# Patient Record
Sex: FEMALE | Race: WHITE | Employment: FULL TIME | URBAN - METROPOLITAN AREA
[De-identification: names, ages, dates, MRNs, and addresses within clinical notes are randomized per-mention and may not be internally consistent; named-entity substitution may affect disease eponyms.]

---
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
## 2019-01-26 ENCOUNTER — COMMUNICATION - HEALTHEAST (OUTPATIENT)
Dept: PEDIATRICS | Facility: CLINIC | Age: 19
End: 2019-01-26

## 2019-01-26 DIAGNOSIS — F90.8 ATTENTION DEFICIT HYPERACTIVITY DISORDER (ADHD), OTHER TYPE: ICD-10-CM

## 2019-03-12 ENCOUNTER — COMMUNICATION - HEALTHEAST (OUTPATIENT)
Dept: PEDIATRICS | Facility: CLINIC | Age: 19
End: 2019-03-12

## 2019-03-13 ENCOUNTER — OFFICE VISIT - HEALTHEAST (OUTPATIENT)
Dept: PEDIATRICS | Facility: CLINIC | Age: 19
End: 2019-03-13

## 2019-03-13 ENCOUNTER — COMMUNICATION - HEALTHEAST (OUTPATIENT)
Dept: ADMINISTRATIVE | Facility: CLINIC | Age: 19
End: 2019-03-13

## 2019-03-13 DIAGNOSIS — F32.81 PMDD (PREMENSTRUAL DYSPHORIC DISORDER): ICD-10-CM

## 2019-03-13 DIAGNOSIS — F40.10 SOCIAL ANXIETY DISORDER: ICD-10-CM

## 2019-03-13 DIAGNOSIS — F33.2 SEVERE EPISODE OF RECURRENT MAJOR DEPRESSIVE DISORDER, WITHOUT PSYCHOTIC FEATURES (H): ICD-10-CM

## 2019-03-13 DIAGNOSIS — F90.8 ATTENTION DEFICIT HYPERACTIVITY DISORDER (ADHD), OTHER TYPE: ICD-10-CM

## 2019-03-13 LAB — HCG UR QL: NEGATIVE

## 2019-04-14 ENCOUNTER — COMMUNICATION - HEALTHEAST (OUTPATIENT)
Dept: PEDIATRICS | Facility: CLINIC | Age: 19
End: 2019-04-14

## 2019-04-14 DIAGNOSIS — F90.8 ATTENTION DEFICIT HYPERACTIVITY DISORDER (ADHD), OTHER TYPE: ICD-10-CM

## 2019-05-09 ENCOUNTER — COMMUNICATION - HEALTHEAST (OUTPATIENT)
Dept: ADMINISTRATIVE | Facility: CLINIC | Age: 19
End: 2019-05-09

## 2019-05-23 ENCOUNTER — COMMUNICATION - HEALTHEAST (OUTPATIENT)
Dept: ADMINISTRATIVE | Facility: CLINIC | Age: 19
End: 2019-05-23

## 2019-06-01 ENCOUNTER — COMMUNICATION - HEALTHEAST (OUTPATIENT)
Dept: PEDIATRICS | Facility: CLINIC | Age: 19
End: 2019-06-01

## 2019-06-01 DIAGNOSIS — F90.8 ATTENTION DEFICIT HYPERACTIVITY DISORDER (ADHD), OTHER TYPE: ICD-10-CM

## 2019-06-03 ENCOUNTER — COMMUNICATION - HEALTHEAST (OUTPATIENT)
Dept: PEDIATRICS | Facility: CLINIC | Age: 19
End: 2019-06-03

## 2019-06-03 DIAGNOSIS — R55 BRIEF LOSS OF CONSCIOUSNESS: ICD-10-CM

## 2019-06-04 ENCOUNTER — COMMUNICATION - HEALTHEAST (OUTPATIENT)
Dept: ADMINISTRATIVE | Facility: CLINIC | Age: 19
End: 2019-06-04

## 2019-06-05 ENCOUNTER — OFFICE VISIT - HEALTHEAST (OUTPATIENT)
Dept: PEDIATRICS | Facility: CLINIC | Age: 19
End: 2019-06-05

## 2019-06-05 DIAGNOSIS — D64.9 ANEMIA, UNSPECIFIED TYPE: ICD-10-CM

## 2019-06-05 DIAGNOSIS — R56.9 SEIZURE (H): ICD-10-CM

## 2019-06-10 ENCOUNTER — RECORDS - HEALTHEAST (OUTPATIENT)
Dept: ADMINISTRATIVE | Facility: OTHER | Age: 19
End: 2019-06-10

## 2019-06-17 ENCOUNTER — OFFICE VISIT - HEALTHEAST (OUTPATIENT)
Dept: PEDIATRICS | Facility: CLINIC | Age: 19
End: 2019-06-17

## 2019-06-17 DIAGNOSIS — G40.409 OTHER GENERALIZED EPILEPSY, NOT INTRACTABLE, WITHOUT STATUS EPILEPTICUS (H): ICD-10-CM

## 2019-06-17 DIAGNOSIS — F40.10 SOCIAL ANXIETY DISORDER: ICD-10-CM

## 2019-06-17 DIAGNOSIS — F90.8 ATTENTION DEFICIT HYPERACTIVITY DISORDER (ADHD), OTHER TYPE: ICD-10-CM

## 2019-06-17 DIAGNOSIS — F32.81 PREMENSTRUAL DYSPHORIC DISORDER: ICD-10-CM

## 2019-06-17 DIAGNOSIS — Z30.41 ENCOUNTER FOR SURVEILLANCE OF CONTRACEPTIVE PILLS: ICD-10-CM

## 2019-06-17 DIAGNOSIS — F32.1 CURRENT MODERATE EPISODE OF MAJOR DEPRESSIVE DISORDER, UNSPECIFIED WHETHER RECURRENT (H): ICD-10-CM

## 2019-06-24 ENCOUNTER — COMMUNICATION - HEALTHEAST (OUTPATIENT)
Dept: PEDIATRICS | Facility: CLINIC | Age: 19
End: 2019-06-24

## 2019-07-05 ENCOUNTER — COMMUNICATION - HEALTHEAST (OUTPATIENT)
Dept: PEDIATRICS | Facility: CLINIC | Age: 19
End: 2019-07-05

## 2019-07-05 DIAGNOSIS — F90.8 ATTENTION DEFICIT HYPERACTIVITY DISORDER (ADHD), OTHER TYPE: ICD-10-CM

## 2019-07-08 ENCOUNTER — COMMUNICATION - HEALTHEAST (OUTPATIENT)
Dept: PEDIATRICS | Facility: CLINIC | Age: 19
End: 2019-07-08

## 2019-07-08 DIAGNOSIS — F40.10 SOCIAL ANXIETY DISORDER: ICD-10-CM

## 2019-07-08 DIAGNOSIS — F32.1 CURRENT MODERATE EPISODE OF MAJOR DEPRESSIVE DISORDER, UNSPECIFIED WHETHER RECURRENT (H): ICD-10-CM

## 2019-07-23 ENCOUNTER — COMMUNICATION - HEALTHEAST (OUTPATIENT)
Dept: PEDIATRICS | Facility: CLINIC | Age: 19
End: 2019-07-23

## 2019-07-23 DIAGNOSIS — F32.1 CURRENT MODERATE EPISODE OF MAJOR DEPRESSIVE DISORDER, UNSPECIFIED WHETHER RECURRENT (H): ICD-10-CM

## 2019-07-23 DIAGNOSIS — F40.10 SOCIAL ANXIETY DISORDER: ICD-10-CM

## 2019-07-25 ENCOUNTER — COMMUNICATION - HEALTHEAST (OUTPATIENT)
Dept: PEDIATRICS | Facility: CLINIC | Age: 19
End: 2019-07-25

## 2019-07-25 DIAGNOSIS — F90.8 ATTENTION DEFICIT HYPERACTIVITY DISORDER (ADHD), OTHER TYPE: ICD-10-CM

## 2019-08-20 ENCOUNTER — COMMUNICATION - HEALTHEAST (OUTPATIENT)
Dept: PEDIATRICS | Facility: CLINIC | Age: 19
End: 2019-08-20

## 2019-08-20 DIAGNOSIS — F40.10 SOCIAL ANXIETY DISORDER: ICD-10-CM

## 2019-08-20 DIAGNOSIS — F32.1 CURRENT MODERATE EPISODE OF MAJOR DEPRESSIVE DISORDER, UNSPECIFIED WHETHER RECURRENT (H): ICD-10-CM

## 2019-08-21 ENCOUNTER — COMMUNICATION - HEALTHEAST (OUTPATIENT)
Dept: PEDIATRICS | Facility: CLINIC | Age: 19
End: 2019-08-21

## 2019-08-21 DIAGNOSIS — F90.8 ATTENTION DEFICIT HYPERACTIVITY DISORDER (ADHD), OTHER TYPE: ICD-10-CM

## 2019-08-21 DIAGNOSIS — F40.10 SOCIAL ANXIETY DISORDER: ICD-10-CM

## 2019-08-21 DIAGNOSIS — D64.9 ANEMIA, UNSPECIFIED TYPE: ICD-10-CM

## 2019-08-21 DIAGNOSIS — F32.1 CURRENT MODERATE EPISODE OF MAJOR DEPRESSIVE DISORDER, UNSPECIFIED WHETHER RECURRENT (H): ICD-10-CM

## 2019-08-23 ENCOUNTER — COMMUNICATION - HEALTHEAST (OUTPATIENT)
Dept: PEDIATRICS | Facility: CLINIC | Age: 19
End: 2019-08-23

## 2019-09-17 ENCOUNTER — COMMUNICATION - HEALTHEAST (OUTPATIENT)
Dept: PEDIATRICS | Facility: CLINIC | Age: 19
End: 2019-09-17

## 2019-09-17 DIAGNOSIS — F40.10 SOCIAL ANXIETY DISORDER: ICD-10-CM

## 2019-09-17 DIAGNOSIS — F32.1 CURRENT MODERATE EPISODE OF MAJOR DEPRESSIVE DISORDER, UNSPECIFIED WHETHER RECURRENT (H): ICD-10-CM

## 2019-09-20 ENCOUNTER — COMMUNICATION - HEALTHEAST (OUTPATIENT)
Dept: PEDIATRICS | Facility: CLINIC | Age: 19
End: 2019-09-20

## 2019-10-20 ENCOUNTER — COMMUNICATION - HEALTHEAST (OUTPATIENT)
Dept: PEDIATRICS | Facility: CLINIC | Age: 19
End: 2019-10-20

## 2019-10-20 DIAGNOSIS — F90.8 ATTENTION DEFICIT HYPERACTIVITY DISORDER (ADHD), OTHER TYPE: ICD-10-CM

## 2019-11-26 ENCOUNTER — COMMUNICATION - HEALTHEAST (OUTPATIENT)
Dept: PEDIATRICS | Facility: CLINIC | Age: 19
End: 2019-11-26

## 2019-11-26 DIAGNOSIS — F90.8 ATTENTION DEFICIT HYPERACTIVITY DISORDER (ADHD), OTHER TYPE: ICD-10-CM

## 2019-11-26 DIAGNOSIS — D64.9 ANEMIA, UNSPECIFIED TYPE: ICD-10-CM

## 2019-12-31 ENCOUNTER — COMMUNICATION - HEALTHEAST (OUTPATIENT)
Dept: PEDIATRICS | Facility: CLINIC | Age: 19
End: 2019-12-31

## 2019-12-31 DIAGNOSIS — F90.8 ATTENTION DEFICIT HYPERACTIVITY DISORDER (ADHD), OTHER TYPE: ICD-10-CM

## 2020-01-10 ENCOUNTER — OFFICE VISIT - HEALTHEAST (OUTPATIENT)
Dept: PEDIATRICS | Facility: CLINIC | Age: 20
End: 2020-01-10

## 2020-01-10 DIAGNOSIS — F90.8 ATTENTION DEFICIT HYPERACTIVITY DISORDER (ADHD), OTHER TYPE: ICD-10-CM

## 2020-01-10 DIAGNOSIS — F32.1 MODERATE MAJOR DEPRESSION (H): ICD-10-CM

## 2020-01-10 DIAGNOSIS — F40.10 SOCIAL ANXIETY DISORDER: ICD-10-CM

## 2020-01-10 DIAGNOSIS — F32.1 CURRENT MODERATE EPISODE OF MAJOR DEPRESSIVE DISORDER, UNSPECIFIED WHETHER RECURRENT (H): ICD-10-CM

## 2020-01-10 ASSESSMENT — PATIENT HEALTH QUESTIONNAIRE - PHQ9: SUM OF ALL RESPONSES TO PHQ QUESTIONS 1-9: 13

## 2020-01-10 ASSESSMENT — ANXIETY QUESTIONNAIRES
2. NOT BEING ABLE TO STOP OR CONTROL WORRYING: MORE THAN HALF THE DAYS
IF YOU CHECKED OFF ANY PROBLEMS ON THIS QUESTIONNAIRE, HOW DIFFICULT HAVE THESE PROBLEMS MADE IT FOR YOU TO DO YOUR WORK, TAKE CARE OF THINGS AT HOME, OR GET ALONG WITH OTHER PEOPLE: SOMEWHAT DIFFICULT
6. BECOMING EASILY ANNOYED OR IRRITABLE: MORE THAN HALF THE DAYS
1. FEELING NERVOUS, ANXIOUS, OR ON EDGE: MORE THAN HALF THE DAYS
4. TROUBLE RELAXING: SEVERAL DAYS
GAD7 TOTAL SCORE: 12
5. BEING SO RESTLESS THAT IT IS HARD TO SIT STILL: SEVERAL DAYS
3. WORRYING TOO MUCH ABOUT DIFFERENT THINGS: MORE THAN HALF THE DAYS
7. FEELING AFRAID AS IF SOMETHING AWFUL MIGHT HAPPEN: MORE THAN HALF THE DAYS

## 2020-02-13 ENCOUNTER — COMMUNICATION - HEALTHEAST (OUTPATIENT)
Dept: PEDIATRICS | Facility: CLINIC | Age: 20
End: 2020-02-13

## 2020-02-13 DIAGNOSIS — F90.8 ATTENTION DEFICIT HYPERACTIVITY DISORDER (ADHD), OTHER TYPE: ICD-10-CM

## 2020-02-18 ENCOUNTER — COMMUNICATION - HEALTHEAST (OUTPATIENT)
Dept: PEDIATRICS | Facility: CLINIC | Age: 20
End: 2020-02-18

## 2020-02-18 DIAGNOSIS — F90.8 ATTENTION DEFICIT HYPERACTIVITY DISORDER (ADHD), OTHER TYPE: ICD-10-CM

## 2020-02-23 ENCOUNTER — HEALTH MAINTENANCE LETTER (OUTPATIENT)
Age: 20
End: 2020-02-23

## 2020-03-21 ENCOUNTER — COMMUNICATION - HEALTHEAST (OUTPATIENT)
Dept: PEDIATRICS | Facility: CLINIC | Age: 20
End: 2020-03-21

## 2020-03-21 DIAGNOSIS — D64.9 ANEMIA, UNSPECIFIED TYPE: ICD-10-CM

## 2020-03-21 DIAGNOSIS — F90.8 ATTENTION DEFICIT HYPERACTIVITY DISORDER (ADHD), OTHER TYPE: ICD-10-CM

## 2020-04-16 ENCOUNTER — COMMUNICATION - HEALTHEAST (OUTPATIENT)
Dept: PEDIATRICS | Facility: CLINIC | Age: 20
End: 2020-04-16

## 2020-04-16 DIAGNOSIS — F90.8 ATTENTION DEFICIT HYPERACTIVITY DISORDER (ADHD), OTHER TYPE: ICD-10-CM

## 2020-05-11 ENCOUNTER — COMMUNICATION - HEALTHEAST (OUTPATIENT)
Dept: PEDIATRICS | Facility: CLINIC | Age: 20
End: 2020-05-11

## 2020-05-11 DIAGNOSIS — F40.10 SOCIAL ANXIETY DISORDER: ICD-10-CM

## 2020-05-11 DIAGNOSIS — F32.1 CURRENT MODERATE EPISODE OF MAJOR DEPRESSIVE DISORDER, UNSPECIFIED WHETHER RECURRENT (H): ICD-10-CM

## 2020-05-17 ENCOUNTER — COMMUNICATION - HEALTHEAST (OUTPATIENT)
Dept: PEDIATRICS | Facility: CLINIC | Age: 20
End: 2020-05-17

## 2020-05-17 DIAGNOSIS — F32.1 CURRENT MODERATE EPISODE OF MAJOR DEPRESSIVE DISORDER, UNSPECIFIED WHETHER RECURRENT (H): ICD-10-CM

## 2020-05-17 DIAGNOSIS — F40.10 SOCIAL ANXIETY DISORDER: ICD-10-CM

## 2020-06-14 ENCOUNTER — COMMUNICATION - HEALTHEAST (OUTPATIENT)
Dept: PEDIATRICS | Facility: CLINIC | Age: 20
End: 2020-06-14

## 2020-06-14 DIAGNOSIS — D64.9 ANEMIA, UNSPECIFIED TYPE: ICD-10-CM

## 2020-06-19 ENCOUNTER — COMMUNICATION - HEALTHEAST (OUTPATIENT)
Dept: PEDIATRICS | Facility: CLINIC | Age: 20
End: 2020-06-19

## 2020-06-19 DIAGNOSIS — Z30.41 ENCOUNTER FOR SURVEILLANCE OF CONTRACEPTIVE PILLS: ICD-10-CM

## 2020-06-19 DIAGNOSIS — D64.9 ANEMIA, UNSPECIFIED TYPE: ICD-10-CM

## 2020-06-19 DIAGNOSIS — F90.8 ATTENTION DEFICIT HYPERACTIVITY DISORDER (ADHD), OTHER TYPE: ICD-10-CM

## 2020-06-22 RX ORDER — FERROUS SULFATE 325(65) MG
1 TABLET ORAL
Qty: 90 TABLET | Refills: 0 | Status: SHIPPED | OUTPATIENT
Start: 2020-06-22

## 2020-06-23 ENCOUNTER — COMMUNICATION - HEALTHEAST (OUTPATIENT)
Dept: SCHEDULING | Facility: CLINIC | Age: 20
End: 2020-06-23

## 2020-07-25 ENCOUNTER — COMMUNICATION - HEALTHEAST (OUTPATIENT)
Dept: PEDIATRICS | Facility: CLINIC | Age: 20
End: 2020-07-25

## 2020-07-25 DIAGNOSIS — F90.8 ATTENTION DEFICIT HYPERACTIVITY DISORDER (ADHD), OTHER TYPE: ICD-10-CM

## 2020-09-09 ENCOUNTER — COMMUNICATION - HEALTHEAST (OUTPATIENT)
Dept: PEDIATRICS | Facility: CLINIC | Age: 20
End: 2020-09-09

## 2020-09-14 ENCOUNTER — COMMUNICATION - HEALTHEAST (OUTPATIENT)
Dept: PEDIATRICS | Facility: CLINIC | Age: 20
End: 2020-09-14

## 2020-09-14 DIAGNOSIS — F90.8 ATTENTION DEFICIT HYPERACTIVITY DISORDER (ADHD), OTHER TYPE: ICD-10-CM

## 2020-09-16 ENCOUNTER — OFFICE VISIT - HEALTHEAST (OUTPATIENT)
Dept: PEDIATRICS | Facility: CLINIC | Age: 20
End: 2020-09-16

## 2020-09-16 DIAGNOSIS — F40.10 SOCIAL ANXIETY DISORDER: ICD-10-CM

## 2020-09-16 DIAGNOSIS — F32.1 CURRENT MODERATE EPISODE OF MAJOR DEPRESSIVE DISORDER, UNSPECIFIED WHETHER RECURRENT (H): ICD-10-CM

## 2020-09-16 DIAGNOSIS — F90.8 ATTENTION DEFICIT HYPERACTIVITY DISORDER (ADHD), OTHER TYPE: ICD-10-CM

## 2020-09-16 DIAGNOSIS — Z30.41 ENCOUNTER FOR SURVEILLANCE OF CONTRACEPTIVE PILLS: ICD-10-CM

## 2020-09-16 DIAGNOSIS — F32.81 PREMENSTRUAL DYSPHORIC DISORDER: ICD-10-CM

## 2020-09-16 ASSESSMENT — PATIENT HEALTH QUESTIONNAIRE - PHQ9: SUM OF ALL RESPONSES TO PHQ QUESTIONS 1-9: 9

## 2020-10-29 ENCOUNTER — COMMUNICATION - HEALTHEAST (OUTPATIENT)
Dept: PEDIATRICS | Facility: CLINIC | Age: 20
End: 2020-10-29

## 2020-10-29 DIAGNOSIS — F90.8 ATTENTION DEFICIT HYPERACTIVITY DISORDER (ADHD), OTHER TYPE: ICD-10-CM

## 2020-11-16 ENCOUNTER — COMMUNICATION - HEALTHEAST (OUTPATIENT)
Dept: PEDIATRICS | Facility: CLINIC | Age: 20
End: 2020-11-16

## 2020-11-16 DIAGNOSIS — F40.10 SOCIAL ANXIETY DISORDER: ICD-10-CM

## 2020-11-16 DIAGNOSIS — F32.1 CURRENT MODERATE EPISODE OF MAJOR DEPRESSIVE DISORDER, UNSPECIFIED WHETHER RECURRENT (H): ICD-10-CM

## 2020-12-06 ENCOUNTER — HEALTH MAINTENANCE LETTER (OUTPATIENT)
Age: 20
End: 2020-12-06

## 2020-12-17 ENCOUNTER — COMMUNICATION - HEALTHEAST (OUTPATIENT)
Dept: PEDIATRICS | Facility: CLINIC | Age: 20
End: 2020-12-17

## 2020-12-17 DIAGNOSIS — F90.8 ATTENTION DEFICIT HYPERACTIVITY DISORDER (ADHD), OTHER TYPE: ICD-10-CM

## 2021-01-15 ENCOUNTER — HEALTH MAINTENANCE LETTER (OUTPATIENT)
Age: 21
End: 2021-01-15

## 2021-02-09 ENCOUNTER — COMMUNICATION - HEALTHEAST (OUTPATIENT)
Dept: PEDIATRICS | Facility: CLINIC | Age: 21
End: 2021-02-09

## 2021-02-09 DIAGNOSIS — F32.1 CURRENT MODERATE EPISODE OF MAJOR DEPRESSIVE DISORDER, UNSPECIFIED WHETHER RECURRENT (H): ICD-10-CM

## 2021-02-09 DIAGNOSIS — F40.10 SOCIAL ANXIETY DISORDER: ICD-10-CM

## 2021-02-28 ENCOUNTER — COMMUNICATION - HEALTHEAST (OUTPATIENT)
Dept: PEDIATRICS | Facility: CLINIC | Age: 21
End: 2021-02-28

## 2021-02-28 DIAGNOSIS — F90.8 ATTENTION DEFICIT HYPERACTIVITY DISORDER (ADHD), OTHER TYPE: ICD-10-CM

## 2021-03-05 ENCOUNTER — COMMUNICATION - HEALTHEAST (OUTPATIENT)
Dept: ADMINISTRATIVE | Facility: CLINIC | Age: 21
End: 2021-03-05

## 2021-03-10 ENCOUNTER — OFFICE VISIT - HEALTHEAST (OUTPATIENT)
Dept: PEDIATRICS | Facility: CLINIC | Age: 21
End: 2021-03-10

## 2021-03-10 DIAGNOSIS — F32.1 CURRENT MODERATE EPISODE OF MAJOR DEPRESSIVE DISORDER, UNSPECIFIED WHETHER RECURRENT (H): ICD-10-CM

## 2021-03-10 DIAGNOSIS — F90.8 ATTENTION DEFICIT HYPERACTIVITY DISORDER (ADHD), OTHER TYPE: ICD-10-CM

## 2021-03-10 DIAGNOSIS — F40.10 SOCIAL ANXIETY DISORDER: ICD-10-CM

## 2021-03-10 DIAGNOSIS — Z30.41 ENCOUNTER FOR SURVEILLANCE OF CONTRACEPTIVE PILLS: ICD-10-CM

## 2021-03-10 RX ORDER — NORGESTIMATE AND ETHINYL ESTRADIOL 0.25-0.035
1 KIT ORAL DAILY
Qty: 84 TABLET | Refills: 4 | Status: SHIPPED | OUTPATIENT
Start: 2021-03-10 | End: 2022-01-23

## 2021-03-10 RX ORDER — VENLAFAXINE HYDROCHLORIDE 75 MG/1
75 CAPSULE, EXTENDED RELEASE ORAL DAILY
Qty: 90 CAPSULE | Refills: 1 | Status: SHIPPED | OUTPATIENT
Start: 2021-03-10 | End: 2021-11-11

## 2021-03-10 ASSESSMENT — ANXIETY QUESTIONNAIRES
5. BEING SO RESTLESS THAT IT IS HARD TO SIT STILL: MORE THAN HALF THE DAYS
2. NOT BEING ABLE TO STOP OR CONTROL WORRYING: SEVERAL DAYS
6. BECOMING EASILY ANNOYED OR IRRITABLE: SEVERAL DAYS
1. FEELING NERVOUS, ANXIOUS, OR ON EDGE: SEVERAL DAYS
3. WORRYING TOO MUCH ABOUT DIFFERENT THINGS: SEVERAL DAYS
4. TROUBLE RELAXING: SEVERAL DAYS
GAD7 TOTAL SCORE: 8
IF YOU CHECKED OFF ANY PROBLEMS ON THIS QUESTIONNAIRE, HOW DIFFICULT HAVE THESE PROBLEMS MADE IT FOR YOU TO DO YOUR WORK, TAKE CARE OF THINGS AT HOME, OR GET ALONG WITH OTHER PEOPLE: VERY DIFFICULT
7. FEELING AFRAID AS IF SOMETHING AWFUL MIGHT HAPPEN: SEVERAL DAYS

## 2021-03-10 ASSESSMENT — PATIENT HEALTH QUESTIONNAIRE - PHQ9: SUM OF ALL RESPONSES TO PHQ QUESTIONS 1-9: 10

## 2021-04-09 ENCOUNTER — COMMUNICATION - HEALTHEAST (OUTPATIENT)
Dept: PEDIATRICS | Facility: CLINIC | Age: 21
End: 2021-04-09

## 2021-04-11 ENCOUNTER — HEALTH MAINTENANCE LETTER (OUTPATIENT)
Age: 21
End: 2021-04-11

## 2021-04-13 ENCOUNTER — FCC EXTENDED DOCUMENTATION (OUTPATIENT)
Dept: PSYCHOLOGY | Facility: OTHER | Age: 21
End: 2021-04-13

## 2021-04-13 NOTE — PROGRESS NOTES
Provider attempted to reach the patient for their appointment. The patient did not answer phone calls. The provider LVM giving her the number to intake if she wanted to reschedule.

## 2021-05-12 ENCOUNTER — COMMUNICATION - HEALTHEAST (OUTPATIENT)
Dept: PEDIATRICS | Facility: CLINIC | Age: 21
End: 2021-05-12

## 2021-05-12 DIAGNOSIS — F40.10 SOCIAL ANXIETY DISORDER: ICD-10-CM

## 2021-05-12 DIAGNOSIS — F90.8 ATTENTION DEFICIT HYPERACTIVITY DISORDER (ADHD), OTHER TYPE: ICD-10-CM

## 2021-05-12 DIAGNOSIS — F32.1 CURRENT MODERATE EPISODE OF MAJOR DEPRESSIVE DISORDER, UNSPECIFIED WHETHER RECURRENT (H): ICD-10-CM

## 2021-05-12 RX ORDER — VENLAFAXINE HYDROCHLORIDE 37.5 MG/1
CAPSULE, EXTENDED RELEASE ORAL
Qty: 90 CAPSULE | Refills: 1 | Status: SHIPPED | OUTPATIENT
Start: 2021-05-12 | End: 2021-11-11

## 2021-05-12 RX ORDER — DEXMETHYLPHENIDATE HYDROCHLORIDE 20 MG/1
20 CAPSULE, EXTENDED RELEASE ORAL DAILY
Qty: 30 CAPSULE | Refills: 0 | Status: SHIPPED | OUTPATIENT
Start: 2021-05-12 | End: 2021-08-10

## 2021-05-27 ASSESSMENT — PATIENT HEALTH QUESTIONNAIRE - PHQ9
SUM OF ALL RESPONSES TO PHQ QUESTIONS 1-9: 9
SUM OF ALL RESPONSES TO PHQ QUESTIONS 1-9: 10
SUM OF ALL RESPONSES TO PHQ QUESTIONS 1-9: 13

## 2021-05-27 NOTE — TELEPHONE ENCOUNTER
Controlled Substance Refill Request  Medication:   Requested Prescriptions     Pending Prescriptions Disp Refills     dexmethylphenidate (FOCALIN XR) 20 MG 24 hr capsule 30 capsule 0     Sig: Take 1 capsule (20 mg total) by mouth daily.     Date Last Fill: 3/13/19  [id number]   Submit electronically to pharmacy  Controlled Substance Agreement on File:   Encounter-Level CSA Scan Date:    There are no encounter-level csa scan date.       Last office visit: Last office visit pertaining to requested medication was 3/13/19.

## 2021-05-28 ENCOUNTER — RECORDS - HEALTHEAST (OUTPATIENT)
Dept: ADMINISTRATIVE | Facility: CLINIC | Age: 21
End: 2021-05-28

## 2021-05-28 ASSESSMENT — ANXIETY QUESTIONNAIRES
GAD7 TOTAL SCORE: 8
GAD7 TOTAL SCORE: 12

## 2021-05-29 NOTE — TELEPHONE ENCOUNTER
"Who is calling:  Patient  Reason for Call:  Patient called to inform Leana Alvarado MD that she was seen in [facility] ED on 5/31/2019 and was there from about 430pm to 9:00 pm.    Pateint reported she had an event after work 5/31/2019 at  [employer].  Patient reports she was looking around at stuff after her shift and had sat down to make an animal, next thing she remembers she was sitting up surrounded by numerous people including paramedics.  She was taken to the ER at [facility].  Patients reports while at the emergency room  she had a headache on the back of her head where the spine connects,  and had patechia on the left side of her face. Patient reports she did not hit her head.    An MRI was done the same night and was told that they did not find anything of concern.  She was told to see a neurologist.  Patient also reported blood work was compelted as well. She states she was told she should have an iron/hemoglobin re-checked because it was a little low.  Patient reported she was told her hemoglobin was 10.7  Patient reported her blood pressure while in the ER was 105/59    Since being discharged patient reports she has felt fine.  Patient states she has been aware of staying hydrated as well as getting vitamins and nutrient through diet.     Patient does report feeling more tired and sleeping more than usual.  Patient reports she was told this not fitting to a seizure but is unclear if this could have been a fainting spell.  Patient Reports she has episodes that she \"falls asleep\" where she loses gaps of time where she cannot recall anything.    Patient was offered to be transferred to scheduling but stated she was unable to schedule at this time due to her mother being the person who would be driving her.  Patient stated she would speak to her mother and figure out if she can get to the office to be seen if it is recommended.    Patient did state she has an upcoming appointment for a " pre-op and is wondering if she should be seen prior to that?     Patient is also questioning if she should follow up with Neurology at [facility] or at [facility]?    Date of last appointment with primary care: 3/13/2019  Okay to leave a detailed message: Yes  [phone]

## 2021-05-29 NOTE — TELEPHONE ENCOUNTER
I am sorry to hear about this episode. I agree that it would be a good idea to see neurology. It is up to them who they would like to see for neurology. However, it may be easier to coordinate care if we have you see neurology in the [facility] or [facility] system. I will enter a referral for this for you.      If you are feeling fine, I think we can wait to see you next week on Monday. However, if you have concerns, are not feeling well or would like to be seen sooner, let me know an we can get you into clinic sooner. I am happy to do either.

## 2021-05-29 NOTE — TELEPHONE ENCOUNTER
FYI - Status Update  Who is Calling: Parent  Update: The patient's mother called and made an ER F/U appointment and received the previous message regarding a neurology referral. They are scheduled to discuss this tomorrow 6/5 in clinic.  Okay to leave a detailed message?:  No return call needed

## 2021-05-29 NOTE — TELEPHONE ENCOUNTER
Refill request for medication: Focalin XR 20 mg   Last visit addressing this medication: 3/13/19  Follow up plan 3  months  Last refill on 4/17/19, quantity #30   CSA completed 3/13/19   checked  06/03/19, last dispensed refill 4/17/19    Appointment: Appointment scheduled for 6/10/19     Elvi Petty LPN

## 2021-05-29 NOTE — TELEPHONE ENCOUNTER
Received a voicemail from Mom Liyah regarding Jimena's psychology order.  I asked her to let me know days and times that work best and what her preference is in location and I will get her scheduled.  I also suggested [facility] messaging and I gave her my email address as well since Nadia and I have been playing phone tag.

## 2021-05-29 NOTE — PROGRESS NOTES
"ASSESSMENT/PLAN:  1. Current moderate episode of major depressive disorder, unspecified whether recurrent (H)  2. Social anxiety disorder  Nadia is a 18 year old female who continues to struggle significantly with her anxiety and depression. She is doing better in the summer, but has significant worsening in the winter months with feeling like she is unable to do her activities. She has multiple thoughts of hurting herself this winter, but no plan to do so. She has been seeing a counselor at school who has been quite helpful. As she is continuing to struggle significantly, recommend tapering sertraline while starting venlafaxine to try to control her symptoms. Discussed risks and benefits. Continue to monitor how she respond.  - sertraline (ZOLOFT) 100 MG tablet; Take 1 tablet (100 mg total) by mouth daily.  Dispense: 30 tablet; Refill: 0  - venlafaxine (EFFEXOR-XR) 37.5 MG 24 hr capsule; Take 1 capsule by mouth daily, increase to 2 capsules daily in 1 week.  Dispense: 60 capsule; Refill: 0    3. Attention deficit hyperactivity disorder (ADHD), other type  Nadia continues to struggle with attention and \"brain fog.\" She has had improvement with focalin. Will continue the current dose for now as she has had a decline in her weight and BMI. Would like to try to improve her mood and anxiety with a change in that medication first as this can help her attention as well.   - dexmethylphenidate (FOCALIN XR) 20 MG 24 hr capsule; Take 1 capsule (20 mg total) by mouth daily.  Dispense: 30 capsule; Refill: 0      4. Encounter for surveillance of contraceptive pills  5. Premenstrual dysphoric disorder  Nadia has premenstrual dysphoric disorder that has improved with birth control pills. Will continue her current pill and continue to monitor this.   - norgestimate-ethinyl estradiol (SPRINTEC, 28,) 0.25-35 mg-mcg per tablet; Take 1 tablet by mouth daily.  Dispense: 84 tablet; Refill: 4    6. Epileptic seizure  She continues to " "undergo investigation for a recent seizure episode. Will Follow up with neurology as recommended. Follow up results of EEG today.       Patient Instructions   1. Decrease sertraline to 1 tablet daily.  2. Start venlafaxine 1 capsule daily.     In 1 week:  3. Decrease sertraline to 1/2 tablet daily  4. Increase venlafaxine to 2 capsules daily.     Mid week next week, please send me a [employer] update with how you are doing. We can then discuss if we need to increase the venlafaxine further when we stop the sertraline at then end of week 2-3.      No orders of the defined types were placed in this encounter.    Medications Discontinued During This Encounter   Medication Reason     norgestimate-ethinyl estradiol (SPRINTEC, 28,) 0.25-35 mg-mcg per tablet Reorder     dexmethylphenidate (FOCALIN XR) 20 MG 24 hr capsule Reorder     sertraline (ZOLOFT) 100 MG tablet Reorder       Return in about 6 weeks (around 7/29/2019) for Recheck, or sooner if symptoms worsen or fail to improve.    CHIEF COMPLAINT:  Chief Complaint   Patient presents with     Medication Management     feels the sertraline goes well during the summer, but really struggles in the fall and winter - still struggles with focus on the focalin       HISTORY OF PRESENT ILLNESS:  Jimena is a 18 y.o. female presenting to the clinic today for:  1. Follow up of her recent seizure. She had evaluation by neurology. She is awaiting results of her EEG. Further plan is pending EEG results. She plans to call to get these results today.     2. Depression: She continues to struggle with depression. She does feel this is better in the summer. She describes significant worsening in the winter. She has difficulty getting out of bed. She had numerous thoughts of not wanting to live, but no plan to hurt herself. She has been seeing a counselor at school. She has been quite helpful and has help to motivate her.     She describes \"brain fog.\" She feels that she is foggy all the " time. She did note improvement with medications and [facility], but continues to feel this clouds her thinking. She is trying to eat well and get appropriate sleep, but is not sure this is helping.    3. Anxiety around social situations continues to be a significant stressor and difficulty for her. She has been working on this with her counselor. That has been helpful, but continues to be a struggle.     4. Attention difficulty  She notes she has had improvement with her focalin. However, she continues to struggle with organization. She often forgets her assignments despite multiple ways of reminding herself. She has trouble getting out of bed in the morning and has been late to work and school multiple times. She has tried multiple ways of reminding herself, but this continues to be a struggle.     5. She has premenstrual dysphoric disorder. This has improved since starting birth control medication. They have noticed significant benefit with this. She is not noticing ill effects from this.         REVIEW OF SYSTEMS:   Review of Symptoms: History obtained from father and the patient.    All other systems are negative.    PFSH:      History reviewed. No pertinent past medical history.    Family History   Problem Relation Age of Onset     Hypothyroidism Maternal Grandmother        Past Surgical History:   Procedure Laterality Date     OK REMOVAL OF TONSILS,<11 Y/O      Description: Tonsillectomy;  Recorded: 03/03/2010;       TOBACCO USE:  Social History     Tobacco Use   Smoking Status Never Smoker   Smokeless Tobacco Never Used       VITALS:  Vitals:    06/17/19 1058   BP: 106/58   Pulse: 88   Weight: 102 lb 12.8 oz (46.6 kg)     Wt Readings from Last 3 Encounters:   06/17/19 102 lb 12.8 oz (46.6 kg) (6 %, Z= -1.57)*   06/05/19 103 lb 8 oz (46.9 kg) (7 %, Z= -1.51)*   03/13/19 104 lb 11.2 oz (47.5 kg) (8 %, Z= -1.40)*     * Growth percentiles are based on CDC (Girls, 2-20 Years) data.     Body mass index is 18.5  kg/m .    PHYSICAL EXAM:  Constitutional: She appears well-developed and well-nourished.   HEENT: Head: Normocephalic.   Cardiovascular: Normal rate and regular rhythm. No murmur heard.  Pulmonary/Chest: Effort normal and breath sounds normal. There is normal air entry.   Neurological: She is alert. Gait normal.   Psychiatric: She has a normal mood and affect. Her speech is normal and behavior is normal.  Skin: Clear. No rashes.     >45 minutes spent with the patient and their family. >50% in counseling and coordination of care.      Electronically signed by Leana Alvarado MD 6/17/2019 9:51 PM

## 2021-05-29 NOTE — TELEPHONE ENCOUNTER
Controlled Substance Refill Request  Medication:   Requested Prescriptions     Pending Prescriptions Disp Refills     dexmethylphenidate (FOCALIN XR) 20 MG 24 hr capsule 30 capsule 0     Sig: Take 1 capsule (20 mg total) by mouth daily.     Date Last Fill: 4/17/19  [id number]  Submit electronically to pharmacy  Controlled Substance Agreement on File:   Encounter-Level CSA Scan Date:    There are no encounter-level csa scan date.       Last office visit: Last office visit pertaining to requested medication was 3/13/19 .

## 2021-05-29 NOTE — PATIENT INSTRUCTIONS - HE
1. Decrease sertraline to 1 tablet daily.  2. Start venlafaxine 1 capsule daily.     In 1 week:  3. Decrease sertraline to 1/2 tablet daily  4. Increase venlafaxine to 2 capsules daily.     Mid week next week, please send me a MyChart update with how you are doing. We can then discuss if we need to increase the venlafaxine further when we stop the sertraline at then end of week 2-3.

## 2021-05-29 NOTE — TELEPHONE ENCOUNTER
Called and left a message to check in on Nadia. Received the EEG results from neurology and just wanted to make sure they had their questions answered. Call back if any questions.

## 2021-05-29 NOTE — PROGRESS NOTES
ASSESSMENT/PLAN:  1. Seizure  Nadia had an episode of unresponsiveness at work that sounds consistent with a seizure and post ictal period. This may have been precipitated due to stressors that day. So far testing has been reassuring for cause with normal MRI. Recommend follow up with neurology as scheduled in 5 days. Discussed that she will likely need an EEG to investigate this further. Discussed this process. No driving until that time. Can return to work on Friday if she has transportation to and from this. If she is not feeling well, should call. She doesn't use any heavy machinery and she is not near water at work.   Will follow up regarding her upcoming oral surgery based on her neurology appointment as well.     2. Anemia, unspecified type  Nadia has anemia of 10.7 from the ED. Recommend starting ferrous sulfate. Discussed that it helps to take this with orange juice. Will recheck hemoglobin when she returns in 2 weeks.   - ferrous sulfate 325 (65 FE) MG tablet; Take 1 tablet (325 mg total) by mouth daily with breakfast.  Dispense: 90 tablet; Refill: 0        Patient Instructions   Recommend and iron supplement of 325 mg daily.      No orders of the defined types were placed in this encounter.    There are no discontinued medications.    Return in about 2 weeks (around 6/19/2019) for preop and medication check.    CHIEF COMPLAINT:  Chief Complaint   Patient presents with     Follow-up     ER 5/31 seizure- feeling better        HISTORY OF PRESENT ILLNESS:  Jimena is a 18 y.o. female presenting to the clinic today for follow up of an ED visit on 5/31. Nadia was working at [employer] on 5/31 as per usual. She was feeling well. She does feel she hadn't slept quite as well, but otherwise was well. She was sitting in a chair and became unresponsive. The  than got her to the ground and laid her down. She didn't fall or hit her head. She is unaware of how long this was, but it took about 10  minutes for EMS to get there. She was tired and foggy following the event. Her coworker felt she had a seizure and that her eyes were jerking. She didn't have tonic clonic movements. Glucose was 98 on arrival of EMS. She had a pretzel for lunch, but hadn't been able to have a full lunch and didn't drink well that day.   She was taken to [facility] ED. She had a normal MRI and exam. She was found to have a hemoglobin of 10.7. She was discharged to home and is planned for neurology follow up on 6/10.   She has not been driving since this even and will not be driving until seeing neurology. She would like to know about the future plans. She would like to know how to determine the next steps.     REVIEW OF SYSTEMS:   Review of Symptoms: History obtained from mother and the patient.    All other systems are negative.    PFSH:    No past medical history on file.    Family History   Problem Relation Age of Onset     Hypothyroidism Maternal Grandmother        Past Surgical History:   Procedure Laterality Date     [place] REMOVAL OF TONSILS,<11 Y/O      Description: Tonsillectomy;  Recorded: 03/03/2010;       TOBACCO USE:  Social History     Tobacco Use   Smoking Status Never Smoker   Smokeless Tobacco Never Used       VITALS:  Vitals:    06/05/19 1339   BP: 106/74   Pulse: 76   Weight: 103 lb 8 oz (46.9 kg)     Wt Readings from Last 3 Encounters:   06/05/19 103 lb 8 oz (46.9 kg) (7 %, Z= -1.51)*   03/13/19 104 lb 11.2 oz (47.5 kg) (8 %, Z= -1.40)*   12/17/18 103 lb 6.4 oz (46.9 kg) (7 %, Z= -1.48)*     * Growth percentiles are based on CDC (Girls, 2-20 Years) data.     Body mass index is 18.63 kg/m .    PHYSICAL EXAM:  Constitutional: She appears well-developed and well-nourished.   Neurological: She is alert. Gait normal.   Psychiatric: She has a slightly depressed mood and affect. Her speech is normal and behavior is normal.    >40 minutes spent with the patient and their family. >50% in counseling and coordination of  care.        Electronically signed by Leana Alvarado MD 6/5/2019 10:20 PM

## 2021-05-30 VITALS — HEIGHT: 62 IN | WEIGHT: 117.6 LBS | BODY MASS INDEX: 21.64 KG/M2

## 2021-05-30 VITALS — WEIGHT: 114.2 LBS | HEIGHT: 63 IN | BODY MASS INDEX: 20.23 KG/M2

## 2021-05-30 NOTE — TELEPHONE ENCOUNTER
Patient Returning Call  Reason for call:  Medication change  Information relayed to patient:  Father is asking about this .  The wife waited last evening for over an hour at pharmacy.  The writer did contact [facility] and lab staff is going to share this with [facility] to attempt to get his matter cleared up. .     Patient has additional questions:  Yes  If YES, what are your questions/concerns:  Please call when changed to [facility].   Okay to leave a detailed message?: Yes

## 2021-05-30 NOTE — TELEPHONE ENCOUNTER
RN cannot approve Refill Request    RN can NOT refill this medication overdue for office visits and/or labs.    Laz Bass, [facility] Triage/Med Refill 7/9/2019    Requested Prescriptions   Pending Prescriptions Disp Refills     sertraline (ZOLOFT) 100 MG tablet 30 tablet 0     Sig: Take 1 tablet (100 mg total) by mouth daily.       SSRI Refill Protocol  Failed - 7/8/2019 12:14 PM        Failed - Age 21 and younger route to prescribing provider     Last office visit with prescriber/PCP: 6/17/2019 Leana Alvarado MD OR same dept: 6/17/2019 Leana Alvarado MD OR same specialty: 6/17/2019 Leana Alvarado MD  Last physical: 12/17/2018 Last MTM visit: Visit date not found   Next visit within 3 mo: Visit date not found  Next physical within 3 mo: Visit date not found  Prescriber OR PCP: Leana Alvarado MD  Last diagnosis associated with med order: 1. Current moderate episode of major depressive disorder, unspecified whether recurrent (H)  - sertraline (ZOLOFT) 100 MG tablet; Take 1 tablet (100 mg total) by mouth daily.  Dispense: 30 tablet; Refill: 0  - venlafaxine (EFFEXOR-XR) 37.5 MG 24 hr capsule; Take 1 capsule by mouth daily, increase to 2 capsules daily in 1 week.  Dispense: 60 capsule; Refill: 0    2. Social anxiety disorder  - venlafaxine (EFFEXOR-XR) 37.5 MG 24 hr capsule; Take 1 capsule by mouth daily, increase to 2 capsules daily in 1 week.  Dispense: 60 capsule; Refill: 0    If protocol passes may refill for 12 months if within 3 months of last provider visit (or a total of 15 months).             Passed - PCP or prescribing provider visit in last year     Last office visit with prescriber/PCP: 6/17/2019 Leana Alvarado MD OR same dept: 6/17/2019 Leana Alvarado MD OR same specialty: 6/17/2019 Leana Alvarado MD  Last physical: 12/17/2018 Last MTM visit: Visit date not found   Next visit within 3 mo: Visit date not  found  Next physical within 3 mo: Visit date not found  Prescriber OR PCP: Leana Alvarado MD  Last diagnosis associated with med order: 1. Current moderate episode of major depressive disorder, unspecified whether recurrent (H)  - sertraline (ZOLOFT) 100 MG tablet; Take 1 tablet (100 mg total) by mouth daily.  Dispense: 30 tablet; Refill: 0  - venlafaxine (EFFEXOR-XR) 37.5 MG 24 hr capsule; Take 1 capsule by mouth daily, increase to 2 capsules daily in 1 week.  Dispense: 60 capsule; Refill: 0    2. Social anxiety disorder  - venlafaxine (EFFEXOR-XR) 37.5 MG 24 hr capsule; Take 1 capsule by mouth daily, increase to 2 capsules daily in 1 week.  Dispense: 60 capsule; Refill: 0    If protocol passes may refill for 12 months if within 3 months of last provider visit (or a total of 15 months).             venlafaxine (EFFEXOR-XR) 37.5 MG 24 hr capsule 60 capsule 0     Sig: Take 1 capsule by mouth daily, increase to 2 capsules daily in 1 week.       Venlafaxine/Desvenlafaxine Refill Protocol Failed - 7/8/2019 12:14 PM        Failed - LFT or AST or ALT in last year     No results found for: ALBUMIN, BILITOT, BILIDIR, ALKPHOS, AST, ALT, PROT             Failed - Age 21 and younger route to prescribing provider     Last office visit with prescriber/PCP: 6/17/2019 Leana Alvarado MD OR same dept: 6/17/2019 Leana Alvarado MD OR same specialty: 6/17/2019 Leana Alvarado MD  Last physical: 12/17/2018 Last MTM visit: Visit date not found   Next visit within 3 mo: Visit date not found  Next physical within 3 mo: Visit date not found  Prescriber OR PCP: Leana Alvarado MD  Last diagnosis associated with med order: 1. Current moderate episode of major depressive disorder, unspecified whether recurrent (H)  - sertraline (ZOLOFT) 100 MG tablet; Take 1 tablet (100 mg total) by mouth daily.  Dispense: 30 tablet; Refill: 0  - venlafaxine (EFFEXOR-XR) 37.5 MG 24 hr capsule; Take 1  capsule by mouth daily, increase to 2 capsules daily in 1 week.  Dispense: 60 capsule; Refill: 0    2. Social anxiety disorder  - venlafaxine (EFFEXOR-XR) 37.5 MG 24 hr capsule; Take 1 capsule by mouth daily, increase to 2 capsules daily in 1 week.  Dispense: 60 capsule; Refill: 0    If protocol passes may refill for 12 months if within 3 months of last provider visit (or a total of 15 months).             Passed - Fasting lipid cascade in last year     Cholesterol   Date Value Ref Range Status   12/17/2018 151 <=199 mg/dL Final     Triglycerides   Date Value Ref Range Status   12/17/2018 56 <=149 mg/dL Final     HDL Cholesterol   Date Value Ref Range Status   12/17/2018 59 >=50 mg/dL Final     LDL Calculated   Date Value Ref Range Status   12/17/2018 81 <=129 mg/dL Final     Patient Fasting > 8hrs?   Date Value Ref Range Status   12/17/2018 Yes  Final             Passed - Blood Pressure in last year     BP Readings from Last 1 Encounters:   06/17/19 106/58

## 2021-05-30 NOTE — TELEPHONE ENCOUNTER
Pt calling back about request to resend prescription to [facility] in [place] (see previous messages).

## 2021-05-30 NOTE — TELEPHONE ENCOUNTER
This was resent. Can you please cancel the prior prescription at the wrong pharmacy.     Please let the family know I am sorry about the confusion, but it should be at the correct pharmacy now. Thanks.

## 2021-05-30 NOTE — TELEPHONE ENCOUNTER
Medication Question or Clarification  Who is calling: Other: Patient's mother, Liyah  What medication are you calling about? (include dose and sig)    Disp Refills Start End    dexmethylphenidate (FOCALIN XR) 20 MG 24 hr capsule 30 capsule 0 7/24/2019     Sig - Route: Take 1 capsule (20 mg total) by mouth daily. - Oral    Sent to pharmacy as: dexmethylphenidate (FOCALIN XR) 20 MG 24 hr capsule    Earliest Fill Date: 7/24/2019      Who prescribed the medication?: Leana Alvarado MD  What is your question/concern?: Patient's mother called and stated the above medication was sent to the wrong pharmacy.  Caller stated it was sent to the [facility] in [place] instead of the [facility] in [place].  Caller reported she was informed by the pharmacy that the Rx that was sent to the [place] location would need to be cancelled by the provider and a new Rx be sent the [facility] [place] location.      Caller is requesting this to be addressed as soon as possible, the patient is out of her medication.  Caller is requesting a return call when this has been taken care of so then she may follow up with the pharmacy.  Pharmacy: [facility] [place] ([place])  Okay to leave a detailed message?: No  Site CMT - Please call the pharmacy to obtain any additional needed information.

## 2021-05-30 NOTE — TELEPHONE ENCOUNTER
Took call from CC. Focalin was sent to her pharmacy down at [facility] but patient is home for summer. Please update family when new script is sent to the updated pharmacy.    Thank you,  Bettye Stuart LPN

## 2021-05-31 VITALS — BODY MASS INDEX: 20.64 KG/M2 | WEIGHT: 116.5 LBS | HEIGHT: 63 IN

## 2021-05-31 NOTE — TELEPHONE ENCOUNTER
RN cannot approve Refill Request    RN can NOT refill this medication med is not covered by policy/route to provider.       Chica Arriaga, [facility] Triage/Med Refill 8/21/2019    Requested Prescriptions   Pending Prescriptions Disp Refills     venlafaxine (EFFEXOR-XR) 75 MG 24 hr capsule [Pharmacy Med Name: VENLAFAXINE HCL ER 75 MG CAP] 30 capsule 0     Sig: TAKE 1 CAPSULE BY MOUTH EVERY DAY WITH 37.5MG CAPS .5MG TOTAL DAILY DOSE.       Venlafaxine/Desvenlafaxine Refill Protocol Failed - 8/20/2019  1:13 AM        Failed - LFT or AST or ALT in last year     No results found for: ALBUMIN, BILITOT, BILIDIR, ALKPHOS, AST, ALT, PROT             Failed - Age 21 and younger route to prescribing provider     Last office visit with prescriber/PCP: 6/17/2019 Leana Alvarado MD OR same dept: 6/17/2019 Leana Alvarado MD OR same specialty: 6/17/2019 Leana Alvarado MD  Last physical: 12/17/2018 Last MTM visit: Visit date not found   Next visit within 3 mo: Visit date not found  Next physical within 3 mo: Visit date not found  Prescriber OR PCP: Leana Alvarado MD  Last diagnosis associated with med order: 1. Current moderate episode of major depressive disorder, unspecified whether recurrent (H)  - venlafaxine (EFFEXOR-XR) 37.5 MG 24 hr capsule [Pharmacy Med Name: VENLAFAXINE HCL ER 37.5 MG CAP]; TAKE 1 CAPSULE BY MOUTH EVERY DAY, WITH 75MG CAPSULE .5MG DOSE.  Dispense: 30 capsule; Refill: 0    2. Social anxiety disorder  - venlafaxine (EFFEXOR-XR) 37.5 MG 24 hr capsule [Pharmacy Med Name: VENLAFAXINE HCL ER 37.5 MG CAP]; TAKE 1 CAPSULE BY MOUTH EVERY DAY, WITH 75MG CAPSULE .5MG DOSE.  Dispense: 30 capsule; Refill: 0    If protocol passes may refill for 12 months if within 3 months of last provider visit (or a total of 15 months).             Passed - Fasting lipid cascade in last year     Cholesterol   Date Value Ref Range Status   12/17/2018 151 <=199  mg/dL Final     Triglycerides   Date Value Ref Range Status   12/17/2018 56 <=149 mg/dL Final     HDL Cholesterol   Date Value Ref Range Status   12/17/2018 59 >=50 mg/dL Final     LDL Calculated   Date Value Ref Range Status   12/17/2018 81 <=129 mg/dL Final     Patient Fasting > 8hrs?   Date Value Ref Range Status   12/17/2018 Yes  Final             Passed - Blood Pressure in last year     BP Readings from Last 1 Encounters:   06/17/19 106/58             venlafaxine (EFFEXOR-XR) 37.5 MG 24 hr capsule [Pharmacy Med Name: VENLAFAXINE HCL ER 37.5 MG CAP] 30 capsule 0     Sig: TAKE 1 CAPSULE BY MOUTH EVERY DAY, WITH 75MG CAPSULE .5MG DOSE.       Venlafaxine/Desvenlafaxine Refill Protocol Failed - 8/20/2019  1:13 AM        Failed - LFT or AST or ALT in last year     No results found for: ALBUMIN, BILITOT, BILIDIR, ALKPHOS, AST, ALT, PROT             Failed - Age 21 and younger route to prescribing provider     Last office visit with prescriber/PCP: 6/17/2019 Leana Alvarado MD OR same dept: 6/17/2019 Leana Alvarado MD OR same specialty: 6/17/2019 Leana Alvarado MD  Last physical: 12/17/2018 Last MTM visit: Visit date not found   Next visit within 3 mo: Visit date not found  Next physical within 3 mo: Visit date not found  Prescriber OR PCP: Leana Alvarado MD  Last diagnosis associated with med order: 1. Current moderate episode of major depressive disorder, unspecified whether recurrent (H)  - venlafaxine (EFFEXOR-XR) 37.5 MG 24 hr capsule [Pharmacy Med Name: VENLAFAXINE HCL ER 37.5 MG CAP]; TAKE 1 CAPSULE BY MOUTH EVERY DAY, WITH 75MG CAPSULE .5MG DOSE.  Dispense: 30 capsule; Refill: 0    2. Social anxiety disorder  - venlafaxine (EFFEXOR-XR) 37.5 MG 24 hr capsule [Pharmacy Med Name: VENLAFAXINE HCL ER 37.5 MG CAP]; TAKE 1 CAPSULE BY MOUTH EVERY DAY, WITH 75MG CAPSULE .5MG DOSE.  Dispense: 30 capsule; Refill: 0    If protocol passes may refill for  12 months if within 3 months of last provider visit (or a total of 15 months).             Passed - Fasting lipid cascade in last year     Cholesterol   Date Value Ref Range Status   12/17/2018 151 <=199 mg/dL Final     Triglycerides   Date Value Ref Range Status   12/17/2018 56 <=149 mg/dL Final     HDL Cholesterol   Date Value Ref Range Status   12/17/2018 59 >=50 mg/dL Final     LDL Calculated   Date Value Ref Range Status   12/17/2018 81 <=129 mg/dL Final     Patient Fasting > 8hrs?   Date Value Ref Range Status   12/17/2018 Yes  Final             Passed - Blood Pressure in last year     BP Readings from Last 1 Encounters:   06/17/19 106/58

## 2021-05-31 NOTE — TELEPHONE ENCOUNTER
Controlled Substance Refill Request  Medication:   Requested Prescriptions     Pending Prescriptions Disp Refills     ferrous sulfate 325 (65 FE) MG tablet 90 tablet 0     Sig: Take 1 tablet (325 mg total) by mouth daily with breakfast.     dexmethylphenidate (FOCALIN XR) 20 MG 24 hr capsule 30 capsule 0     Sig: Take 1 capsule (20 mg total) by mouth daily.     Refused Prescriptions Disp Refills     venlafaxine (EFFEXOR-XR) 37.5 MG 24 hr capsule 90 capsule 0     Sig: Take 3 capsules (112.5 mg total) by mouth daily.     Refused By: JAIR OCONNELL     Reason for Refusal: Request already responded to by other means (e.g. phone or fax)     Date Last Fill: 7/16/19  Pharmacy: [facility] [id number]   Submit electronically to pharmacy  Encounter-Level CSA Scan Date:    There are no encounter-level csa scan date.       Last office visit: 6/17/2019 Leana Alvarado MD

## 2021-05-31 NOTE — TELEPHONE ENCOUNTER
RN cannot approve Refill Request    RN can NOT refill this medication Protocol failed and NO refill given. Last office visit: 6/17/2019 Leana Alvarado MD Last Physical: 12/17/2018 Last MTM visit: Visit date not found Last visit same specialty: 6/17/2019 Leana Alvarado MD.  Next visit within 3 mo: Visit date not found  Next physical within 3 mo: Visit date not found      Dilcia Akins, [facility] Triage/Med Refill 8/21/2019    Requested Prescriptions   Pending Prescriptions Disp Refills     ferrous sulfate 325 (65 FE) MG tablet 90 tablet 0     Sig: Take 1 tablet (325 mg total) by mouth daily with breakfast.       Iron Supplements Refill Protocol Failed - 8/21/2019 10:28 AM        Failed - Hemoglobin or Hematocrit in last 12 months     Hemoglobin   Date Value Ref Range Status   11/10/2016 13.8 12.0 - 16.0 g/dL Final     Hematocrit   Date Value Ref Range Status   11/10/2016 39.8 33.0 - 51.0 % Final             Passed - PCP or prescribing provider visit in past 12 months       Last office visit with prescriber/PCP: 6/17/2019 Leana Alvarado MD OR same dept: 6/17/2019 Leana Alvarado MD OR same specialty: 6/17/2019 Leana Alvarado MD  Last physical: 12/17/2018 Last MTM visit: Visit date not found   Next visit within 3 mo: Visit date not found  Next physical within 3 mo: Visit date not found  Prescriber OR PCP: Leana Alvarado MD  Last diagnosis associated with med order: 1. Anemia, unspecified type  - ferrous sulfate 325 (65 FE) MG tablet; Take 1 tablet (325 mg total) by mouth daily with breakfast.  Dispense: 90 tablet; Refill: 0    2. Current moderate episode of major depressive disorder, unspecified whether recurrent (H)  - venlafaxine (EFFEXOR-XR) 37.5 MG 24 hr capsule; Take 3 capsules (112.5 mg total) by mouth daily.  Dispense: 90 capsule; Refill: 0    3. Social anxiety disorder  - venlafaxine (EFFEXOR-XR) 37.5 MG 24 hr capsule; Take 3  capsules (112.5 mg total) by mouth daily.  Dispense: 90 capsule; Refill: 0    4. Attention deficit hyperactivity disorder (ADHD), other type  - dexmethylphenidate (FOCALIN XR) 20 MG 24 hr capsule; Take 1 capsule (20 mg total) by mouth daily.  Dispense: 30 capsule; Refill: 0    If protocol passes may refill for 12 months if within 3 months of last provider visit (or a total of 15 months).             dexmethylphenidate (FOCALIN XR) 20 MG 24 hr capsule 30 capsule 0     Sig: Take 1 capsule (20 mg total) by mouth daily.       Controlled Substances Refill Protocol Failed - 8/21/2019 10:28 AM        Failed - Route all Controlled Substance Requests to Provider        Passed - Patient has controlled substance agreement in past 12 months     Encounter-Level CSA Scan Date:    There are no encounter-level csa scan date.               Passed - Visit with PCP or prescribing provider visit in past 12 months      Last office visit with prescriber/PCP: 6/17/2019 Leana Alvarado MD OR same dept: 6/17/2019 Leana Alvarado MD OR same specialty: 6/17/2019 Leana Alvarado MD Last physical: 12/17/2018 Last MTM visit: Visit date not found    Next visit within 3 mo: Visit date not found  Next physical within 3 mo: Visit date not found  Prescriber OR PCP: Leana Alvarado MD  Last diagnosis associated with med order: 1. Anemia, unspecified type  - ferrous sulfate 325 (65 FE) MG tablet; Take 1 tablet (325 mg total) by mouth daily with breakfast.  Dispense: 90 tablet; Refill: 0    2. Current moderate episode of major depressive disorder, unspecified whether recurrent (H)  - venlafaxine (EFFEXOR-XR) 37.5 MG 24 hr capsule; Take 3 capsules (112.5 mg total) by mouth daily.  Dispense: 90 capsule; Refill: 0    3. Social anxiety disorder  - venlafaxine (EFFEXOR-XR) 37.5 MG 24 hr capsule; Take 3 capsules (112.5 mg total) by mouth daily.  Dispense: 90 capsule; Refill: 0    4. Attention deficit  hyperactivity disorder (ADHD), other type  - dexmethylphenidate (FOCALIN XR) 20 MG 24 hr capsule; Take 1 capsule (20 mg total) by mouth daily.  Dispense: 30 capsule; Refill: 0             Refused Prescriptions Disp Refills     venlafaxine (EFFEXOR-XR) 37.5 MG 24 hr capsule 90 capsule 0     Sig: Take 3 capsules (112.5 mg total) by mouth daily.       Venlafaxine/Desvenlafaxine Refill Protocol Failed - 8/21/2019 10:28 AM        Failed - LFT or AST or ALT in last year     No results found for: ALBUMIN, BILITOT, BILIDIR, ALKPHOS, AST, ALT, PROT             Failed - Age 21 and younger route to prescribing provider     Last office visit with prescriber/PCP: 6/17/2019 Leana Alvarado MD OR same dept: 6/17/2019 Leana Alvarado MD OR same specialty: 6/17/2019 Leana Alvarado MD  Last physical: 12/17/2018 Last MTM visit: Visit date not found   Next visit within 3 mo: Visit date not found  Next physical within 3 mo: Visit date not found  Prescriber OR PCP: Leana Alvarado MD  Last diagnosis associated with med order: 1. Anemia, unspecified type  - ferrous sulfate 325 (65 FE) MG tablet; Take 1 tablet (325 mg total) by mouth daily with breakfast.  Dispense: 90 tablet; Refill: 0    2. Current moderate episode of major depressive disorder, unspecified whether recurrent (H)  - venlafaxine (EFFEXOR-XR) 37.5 MG 24 hr capsule; Take 3 capsules (112.5 mg total) by mouth daily.  Dispense: 90 capsule; Refill: 0    3. Social anxiety disorder  - venlafaxine (EFFEXOR-XR) 37.5 MG 24 hr capsule; Take 3 capsules (112.5 mg total) by mouth daily.  Dispense: 90 capsule; Refill: 0    4. Attention deficit hyperactivity disorder (ADHD), other type  - dexmethylphenidate (FOCALIN XR) 20 MG 24 hr capsule; Take 1 capsule (20 mg total) by mouth daily.  Dispense: 30 capsule; Refill: 0    If protocol passes may refill for 12 months if within 3 months of last provider visit (or a total of 15 months).              Passed - Fasting lipid cascade in last year     Cholesterol   Date Value Ref Range Status   12/17/2018 151 <=199 mg/dL Final     Triglycerides   Date Value Ref Range Status   12/17/2018 56 <=149 mg/dL Final     HDL Cholesterol   Date Value Ref Range Status   12/17/2018 59 >=50 mg/dL Final     LDL Calculated   Date Value Ref Range Status   12/17/2018 81 <=129 mg/dL Final     Patient Fasting > 8hrs?   Date Value Ref Range Status   12/17/2018 Yes  Final             Passed - Blood Pressure in last year     BP Readings from Last 1 Encounters:   06/17/19 106/58

## 2021-06-01 VITALS — WEIGHT: 109.7 LBS | HEIGHT: 62 IN | BODY MASS INDEX: 20.19 KG/M2

## 2021-06-01 VITALS — HEIGHT: 63 IN | BODY MASS INDEX: 19.67 KG/M2 | WEIGHT: 111 LBS

## 2021-06-01 VITALS — WEIGHT: 108.8 LBS | BODY MASS INDEX: 19.74 KG/M2

## 2021-06-01 NOTE — TELEPHONE ENCOUNTER
RN cannot approve Refill Request    RN can NOT refill this medication PCP messaged that patient is overdue for Labs and there is an age restriction on this medication. Last office visit: 6/17/2019 Leana Alvarado MD Last Physical: 12/17/2018 Last MTM visit: Visit date not found Last visit same specialty: 6/17/2019 Leana Alvarado MD.  Next visit within 3 mo: Visit date not found  Next physical within 3 mo: Visit date not found      Analy Lorenzo, [facility] [place] Connection Triage/Med Refill 9/17/2019    Requested Prescriptions   Pending Prescriptions Disp Refills     venlafaxine (EFFEXOR-XR) 75 MG 24 hr capsule [Pharmacy Med Name: VENLAFAXINE HCL ER 75 MG CAP] 30 capsule 0     Sig: TAKE 1 CAPSULE BY MOUTH EVERY DAY WITH 37.5MG CAPS .5MG TOTAL DAILY DOSE.       Venlafaxine/Desvenlafaxine Refill Protocol Failed - 9/17/2019  1:25 AM        Failed - LFT or AST or ALT in last year     No results found for: ALBUMIN, BILITOT, BILIDIR, ALKPHOS, AST, ALT, PROT             Failed - Age 21 and younger route to prescribing provider     Last office visit with prescriber/PCP: 6/17/2019 Leana Alvarado MD OR same dept: 6/17/2019 Leana Alvarado MD OR same specialty: 6/17/2019 Leana Alvarado MD  Last physical: 12/17/2018 Last MTM visit: Visit date not found   Next visit within 3 mo: Visit date not found  Next physical within 3 mo: Visit date not found  Prescriber OR PCP: Leana Alvarado MD  Last diagnosis associated with med order: 1. Current moderate episode of major depressive disorder, unspecified whether recurrent (H)  - venlafaxine (EFFEXOR-XR) 75 MG 24 hr capsule [Pharmacy Med Name: VENLAFAXINE HCL ER 75 MG CAP]; TAKE 1 CAPSULE BY MOUTH EVERY DAY WITH 37.5MG CAPS .5MG TOTAL DAILY DOSE.  Dispense: 30 capsule; Refill: 0  - venlafaxine (EFFEXOR-XR) 37.5 MG 24 hr capsule [Pharmacy Med Name: VENLAFAXINE HCL ER 37.5 MG CAP]; TAKE 1 CAPSULE BY MOUTH EVERY DAY,  WITH 75MG CAPSULE .5MG DOSE.  Dispense: 30 capsule; Refill: 0    2. Social anxiety disorder  - venlafaxine (EFFEXOR-XR) 75 MG 24 hr capsule [Pharmacy Med Name: VENLAFAXINE HCL ER 75 MG CAP]; TAKE 1 CAPSULE BY MOUTH EVERY DAY WITH 37.5MG CAPS .5MG TOTAL DAILY DOSE.  Dispense: 30 capsule; Refill: 0  - venlafaxine (EFFEXOR-XR) 37.5 MG 24 hr capsule [Pharmacy Med Name: VENLAFAXINE HCL ER 37.5 MG CAP]; TAKE 1 CAPSULE BY MOUTH EVERY DAY, WITH 75MG CAPSULE .5MG DOSE.  Dispense: 30 capsule; Refill: 0    If protocol passes may refill for 12 months if within 3 months of last provider visit (or a total of 15 months).             Passed - Fasting lipid cascade in last year     Cholesterol   Date Value Ref Range Status   12/17/2018 151 <=199 mg/dL Final     Triglycerides   Date Value Ref Range Status   12/17/2018 56 <=149 mg/dL Final     HDL Cholesterol   Date Value Ref Range Status   12/17/2018 59 >=50 mg/dL Final     LDL Calculated   Date Value Ref Range Status   12/17/2018 81 <=129 mg/dL Final     Patient Fasting > 8hrs?   Date Value Ref Range Status   12/17/2018 Yes  Final             Passed - Blood Pressure in last year     BP Readings from Last 1 Encounters:   06/17/19 106/58             venlafaxine (EFFEXOR-XR) 37.5 MG 24 hr capsule [Pharmacy Med Name: VENLAFAXINE HCL ER 37.5 MG CAP] 30 capsule 0     Sig: TAKE 1 CAPSULE BY MOUTH EVERY DAY, WITH 75MG CAPSULE .5MG DOSE.       Venlafaxine/Desvenlafaxine Refill Protocol Failed - 9/17/2019  1:25 AM        Failed - LFT or AST or ALT in last year     No results found for: ALBUMIN, BILITOT, BILIDIR, ALKPHOS, AST, ALT, PROT             Failed - Age 21 and younger route to prescribing provider     Last office visit with prescriber/PCP: 6/17/2019 Leana Alvarado MD OR same dept: 6/17/2019 Leana Alvarado MD OR same specialty: 6/17/2019 Leana Alvarado MD  Last physical: 12/17/2018 Last MTM visit: Visit date not found    Next visit within 3 mo: Visit date not found  Next physical within 3 mo: Visit date not found  Prescriber OR PCP: Leana Alvarado MD  Last diagnosis associated with med order: 1. Current moderate episode of major depressive disorder, unspecified whether recurrent (H)  - venlafaxine (EFFEXOR-XR) 75 MG 24 hr capsule [Pharmacy Med Name: VENLAFAXINE HCL ER 75 MG CAP]; TAKE 1 CAPSULE BY MOUTH EVERY DAY WITH 37.5MG CAPS .5MG TOTAL DAILY DOSE.  Dispense: 30 capsule; Refill: 0  - venlafaxine (EFFEXOR-XR) 37.5 MG 24 hr capsule [Pharmacy Med Name: VENLAFAXINE HCL ER 37.5 MG CAP]; TAKE 1 CAPSULE BY MOUTH EVERY DAY, WITH 75MG CAPSULE .5MG DOSE.  Dispense: 30 capsule; Refill: 0    2. Social anxiety disorder  - venlafaxine (EFFEXOR-XR) 75 MG 24 hr capsule [Pharmacy Med Name: VENLAFAXINE HCL ER 75 MG CAP]; TAKE 1 CAPSULE BY MOUTH EVERY DAY WITH 37.5MG CAPS .5MG TOTAL DAILY DOSE.  Dispense: 30 capsule; Refill: 0  - venlafaxine (EFFEXOR-XR) 37.5 MG 24 hr capsule [Pharmacy Med Name: VENLAFAXINE HCL ER 37.5 MG CAP]; TAKE 1 CAPSULE BY MOUTH EVERY DAY, WITH 75MG CAPSULE .5MG DOSE.  Dispense: 30 capsule; Refill: 0    If protocol passes may refill for 12 months if within 3 months of last provider visit (or a total of 15 months).             Passed - Fasting lipid cascade in last year     Cholesterol   Date Value Ref Range Status   12/17/2018 151 <=199 mg/dL Final     Triglycerides   Date Value Ref Range Status   12/17/2018 56 <=149 mg/dL Final     HDL Cholesterol   Date Value Ref Range Status   12/17/2018 59 >=50 mg/dL Final     LDL Calculated   Date Value Ref Range Status   12/17/2018 81 <=129 mg/dL Final     Patient Fasting > 8hrs?   Date Value Ref Range Status   12/17/2018 Yes  Final             Passed - Blood Pressure in last year     BP Readings from Last 1 Encounters:   06/17/19 106/58

## 2021-06-02 VITALS — WEIGHT: 103.4 LBS | BODY MASS INDEX: 18.32 KG/M2 | HEIGHT: 63 IN

## 2021-06-02 VITALS — WEIGHT: 104.7 LBS | BODY MASS INDEX: 18.84 KG/M2

## 2021-06-02 NOTE — TELEPHONE ENCOUNTER
Refill request for medication: dexmethylphenidate (FOCALIN XR) 20 MG 24 hr capsule  Last visit addressing this medication: 06/17/2019  Follow up plan Return in about 6 weeks (around 7/29/2019) for Recheck  months  Last refill on 8/22/2019, quantity #30   CSA completed 03/13/2019   checked  10/21/19, last dispensed refill 08/22/2019    Appointment: Patient will call back to schedule appointment     Eufemia Junior CMA

## 2021-06-02 NOTE — TELEPHONE ENCOUNTER
Controlled Substance Refill Request  Medication:   Requested Prescriptions     Pending Prescriptions Disp Refills     dexmethylphenidate (FOCALIN XR) 20 MG 24 hr capsule 30 capsule 0     Sig: Take 1 capsule (20 mg total) by mouth daily.     Date Last Fill: 8/22/19  Pharmacy:  [id number]  Submit electronically to pharmacy  Controlled Substance Agreement on File:   Encounter-Level CSA Scan Date:    There are no encounter-level csa scan date.       Last office visit: Last office visit pertaining to requested medication was 6/17/19.

## 2021-06-03 VITALS — BODY MASS INDEX: 18.5 KG/M2 | WEIGHT: 102.8 LBS

## 2021-06-03 VITALS — BODY MASS INDEX: 18.63 KG/M2 | WEIGHT: 103.5 LBS

## 2021-06-03 NOTE — TELEPHONE ENCOUNTER
Controlled Substance Refill Request  Medication Name:   Requested Prescriptions     Pending Prescriptions Disp Refills     dexmethylphenidate (FOCALIN XR) 20 MG 24 hr capsule 30 capsule 0     Sig: Take 1 capsule (20 mg total) by mouth daily.     Date Last Fill: 10/21/19  [facility] [place]     Submit electronically to pharmacy  Controlled Substance Agreement Date Scanned:   Encounter-Level CSA Scan Date:    There are no encounter-level csa scan date.       Last office visit with prescriber/PCP: 6/17/2019 Leana Alvarado MD OR same dept: 6/17/2019 Leana Alvarado MD OR same specialty: 6/17/2019 Leana Alvarado MD  Last physical: 12/17/2018 Last MTM visit: Visit date not found

## 2021-06-03 NOTE — TELEPHONE ENCOUNTER
Patient will call back later when she knows her schedule for [place] break as she does not have time over Thanksgiving break.    Leana LLANES CMA ([facility])

## 2021-06-03 NOTE — TELEPHONE ENCOUNTER
I have refilled this, but will leave for Dr Alvarado to review regarding follow up visit interval.

## 2021-06-03 NOTE — TELEPHONE ENCOUNTER
Refill request for medication: dexmethylphenidate (FOCALIN XR) 20 MG 24 hr capsule  Last visit addressing this medication: 06/17/2019  Follow up plan Return in about 6 weeks (around 7/29/2019) for Recheck, or sooner if symptoms worsen or fail to improve.     Last refill on 10/21/2019, quantity #30   CSA completed 03/13/2019   checked  11/26/19, last dispensed refill 10/21/2019    Appointment: No appointments scheduled at this time.     Eufemia Junior, CMA

## 2021-06-03 NOTE — TELEPHONE ENCOUNTER
Please reach out to Jimena and help her set up a follow up appointment when she is home for the holidays from school. Thanks.

## 2021-06-04 VITALS
SYSTOLIC BLOOD PRESSURE: 102 MMHG | BODY MASS INDEX: 18.52 KG/M2 | HEART RATE: 80 BPM | WEIGHT: 102.9 LBS | DIASTOLIC BLOOD PRESSURE: 72 MMHG

## 2021-06-04 NOTE — TELEPHONE ENCOUNTER
Reached out to patient and left a message to return phone call. Please relay the below message to patient and assist with scheduling. Thank you, Eufemia Junior

## 2021-06-04 NOTE — TELEPHONE ENCOUNTER
Controlled Substance Refill Request  Medication Name:   Requested Prescriptions     Pending Prescriptions Disp Refills     dexmethylphenidate (FOCALIN XR) 20 MG 24 hr capsule 30 capsule 0     Sig: Take 1 capsule (20 mg total) by mouth daily.     Date Last Fill: 11/26/19  Pharmacy: [facility] [place], [id number]      Submit electronically to pharmacy  Controlled Substance Agreement Date Scanned:   Encounter-Level CSA Scan Date:    There are no encounter-level csa scan date.       Last office visit with prescriber/PCP: 6/17/2019 Leana Alvarado MD OR same dept: 6/17/2019 Leana Alvarado MD OR same specialty: 6/17/2019 Leana Alvarado MD  Last physical: 12/17/2018 Last MTM visit: Visit date not found      Patient is out of above medication as of today.

## 2021-06-04 NOTE — TELEPHONE ENCOUNTER
I have refilled this. Can you reach out and help schedule an appointment for follow up in the next 1-2 months? This can be a phone visit if she is in school. Thanks.

## 2021-06-05 NOTE — TELEPHONE ENCOUNTER
I contacted mom and patient is currently home from school. Patient is having some struggles with ADHD. I scheduled the patient for 1/10/2020 at 10:20 a.m.  Georgina Berman LPN

## 2021-06-05 NOTE — PROGRESS NOTES
Assessment/Plan:  1. Attention deficit hyperactivity disorder (ADHD), other type  Nadia has ADHD that has been improving with her Focalin mediation. This has been gong well. She does note a decreased appetite but continues to eat her meals despite this. She does note continued difficulty focusing and staying organized. Discussed what the medication can help with and what reminders and behavioral interventions we also need to do. Will increase the Focalin to 25 mg daily and monitor for improvement and side effects.   - dexmethylphenidate 25 mg MP50; Take 25 mg by mouth daily.  Dispense: 30 capsule; Refill: 0    2. Current moderate episode of major depressive disorder, unspecified whether recurrent (H)  3. Social anxiety disorder  Nadia is a 19 year old female with depression and social anxiety. She has significantly improved symptoms with her current venlafaxine. He does continue to have difficulty with this that she works on with a counselor. She feels she is doing well overall though. She would like to continue her current medications. Discussed that we could increase her dose if she feels she is struggling more or needs a boost. She will consider this in the future. Continue to monitor.   - venlafaxine (EFFEXOR-XR) 75 MG 24 hr capsule; TAKE 1 CAPSULE BY MOUTH EVERY DAY WITH 37.5MG CAPS .5MG TOTAL DAILY DOSE.  Dispense: 30 capsule; Refill: 4  - venlafaxine (EFFEXOR-XR) 37.5 MG 24 hr capsule; TAKE 1 CAPSULE BY MOUTH EVERY DAY, WITH 75MG CAPSULE .5MG DOSE.  Dispense: 30 capsule; Refill: 4      There are no Patient Instructions on file for this visit.     Follow up in clinic in 6 months for a medication check and physicial.    SUBJECTIVE:  Jimena Werner is a 19 y.o. female present to clinic to follow up on her ADHD, depression, and anxiety treatment.       Current Outpatient Medications:      dexmethylphenidate 25 mg MP50, Take 25 mg by mouth daily., Disp: 30 capsule, Rfl: 0     ferrous sulfate  325 (65 FE) MG tablet, TAKE 1 TABLET BY MOUTH EVERY DAY WITH BREAKFAST, Disp: 90 tablet, Rfl: 0     norgestimate-ethinyl estradiol (SPRINTEC, 28,) 0.25-35 mg-mcg per tablet, Take 1 tablet by mouth daily., Disp: 84 tablet, Rfl: 4     venlafaxine (EFFEXOR-XR) 37.5 MG 24 hr capsule, TAKE 1 CAPSULE BY MOUTH EVERY DAY, WITH 75MG CAPSULE .5MG DOSE., Disp: 30 capsule, Rfl: 4     venlafaxine (EFFEXOR-XR) 75 MG 24 hr capsule, TAKE 1 CAPSULE BY MOUTH EVERY DAY WITH 37.5MG CAPS .5MG TOTAL DAILY DOSE., Disp: 30 capsule, Rfl: 4    Depression/Anxiety: Mom has noticed that patient seems to be improving, aside from her anxiety regarding school. She seems happier and more confident as well, per mom.   Patient was recently won an appeal of her academic suspension, but is pretty stressed with signing up for her classes for next semester since the semester starts in a few days and she hasn't been able to register for classes yet. Her plan is to try to get this done today. She has reported some struggles with her academics in previous semesters where her class load was overbearing her freshman year. Patient's grades have improved significantly this last semester. Patient planned to go to tutoring more, but she struggles with feeling like she is a bother to the tutors if she pursues this. She reports having a lot of friends at school. She had been seeing her counselor two semesters ago where she saw improvement in both her mood and academics. She would like to reestablish regular appointments with her this upcoming semester.     ADHD: Patient's attention and focus seems to be lacking not only academically, but also at home with some daily tasks. Patient is concerned with herself because despite writing things down or putting in phone reminders, she still forgets things often. She confirms having an audible alert on her phone, but she still struggles with this. Mom said she does better with focus and her mood when she works  in the mornings.    She was last seen on 6/17/19 for a medication follow up.     Her diagnosis was made on: Anxiety/Depression: 3/2017,  ADHD: 6/2018.     Possible Side Effects: Maybe slight appetite suppression, but nothing of concern.     Current school and grade level: College at [employer].  Special classes if any: None noted.   Peer interactions: More friends at school. Still feels like a bother to others occasionally.   Mood: Seems improved since last visit. Patient told mom that her anxiety medications have been helping.   Sleep:Not addressed.   Swallow pills: Not addressed.   Drug use: None noted. The [place] Prescribing Monitoring program website was checked for this patient and did not show any concerning refills.      Social history:   Social History     Social History Narrative    Lives at home with mother and father.             Family stressors: None noted.     Family history:   Family History   Problem Relation Age of Onset     Hypothyroidism Maternal Grandmother          Past Medical History:  No past medical history on file.  Past Surgical History:   Procedure Laterality Date     MA REMOVAL OF TONSILS,<10 Y/O      Description: Tonsillectomy;  Recorded: 03/03/2010;       Allergies:   Allergies   Allergen Reactions     Cauliflower Itching     More of a sensitivity- has Mouth itching       ROS:  General: Health is good  Endocrine: Growing in height and weight well.  GI: Slightly suppressed appetite.    Exam:  Vitals:    01/10/20 1027   BP: 102/72   Patient Site: Left Arm   Patient Position: Sitting   Cuff Size: Adult Regular   Pulse: 80   Weight: 102 lb 14.4 oz (46.7 kg)       MENTAL STATUS:  Gen: Alert, oriented. Well groomed, interacts appropriately with examiner.   Speech:No abnormal speech or flight of ideas.   Mood:Smiling more frequently.  Thought content: No abnormal thought content.  Judgment: intact  Fund of knowledge: appropriate for age.    QUALITY MEASURES:  The following are part of a depression  follow up plan for the patient:  seen by mental health counselor, management of mental health treatment, patient follow-up to return when and if necessary, taking history of mental health assessments, taking history of mental health education and taking history of mental health management    ADDITIONAL HISTORY SUMMARIZED (2): None.  DECISION TO OBTAIN EXTRA INFORMATION (1): None.   RADIOLOGY TESTS (1): None.  LABS (1): None.  MEDICINE TESTS (1): None.  INDEPENDENT REVIEW (2 each): None.     The visit lasted a total of 29 minutes face to face with the patient. Over 50% of the time was spent counseling and educating the patient about ADHD management.    IKathrine am scribing for and in the presence of, Dr. Alvarado.    I, Dr. Alvarado, personally performed the services described in this documentation, as scribed by Kathrine Mascorro in my presence, and it is both accurate and complete.    Total data points: 0

## 2021-06-06 NOTE — TELEPHONE ENCOUNTER
Drug Change Request  [facility]  What is the current medication?:  Dexmethylphenidate 25 mg  What alternative is being requested?: Concerta, Metadate CD or Medtadate ER  Why the request to change?: medication not covered by insurance  [facility]  Okay to leave a detailed message?:  No

## 2021-06-06 NOTE — TELEPHONE ENCOUNTER
Controlled Substance Refill Request  Medication Name:   Requested Prescriptions     Pending Prescriptions Disp Refills     dexmethylphenidate 25 mg MP50 30 capsule 0     Sig: Take 25 mg by mouth daily.     Date Last Fill: 1/10/2020  [id number]  Submit electronically to pharmacy  Controlled Substance Agreement on file:   Encounter-Level CSA Scan Date:    There are no encounter-level csa scan date.        Last office visit:  1/10/2020

## 2021-06-06 NOTE — TELEPHONE ENCOUNTER
Refill request for medication: dexmethylphenidate 25 mg   Last visit addressing this medication: 1/10/2020  Follow up plan 6  months  Last refill on 1/10/2010, quantity #30   CSA completed 3/13/2019   checked  02/13/20, last dispensed refill 1/11/2020    Appointment: Not due     Elvi Petty LPN

## 2021-06-06 NOTE — TELEPHONE ENCOUNTER
Reached out to patient's mother and relayed the below message. N additional questions at this time.   Eufemia Junior

## 2021-06-06 NOTE — TELEPHONE ENCOUNTER
I am covering for Dr. Alvarado today.  After consultation with our Pharm.D., I have sent a prescription to pharmacy for generic Concerta 54 mg, which should be most similar to the Focalin XR 25 mg she is currently taking.  Please call patient and let her know that the pharmacy called us suggesting a switch due to insurance coverage.

## 2021-06-07 NOTE — TELEPHONE ENCOUNTER
Refill request for medication: Concerta 54 mg  Last visit addressing this medication: 1/10/2020  Follow up plan 6  months  Last refill on 2/18/20, quantity #30   CSA completed 3/14/2019   checked  03/23/20, last dispensed refill 2/18/2019    Appointment: Not due     Elvi Petty LPN

## 2021-06-07 NOTE — TELEPHONE ENCOUNTER
RN cannot approve Refill Request    RN can NOT refill this medication med is not covered by policy/route to provider. Last office visit: 1/10/2020 Leana Alvarado MD Last Physical: 12/17/2018 Last MTM visit: Visit date not found Last visit same specialty: 1/10/2020 Leana Alvarado MD.  Next visit within 3 mo: Visit date not found  Next physical within 3 mo: Visit date not found      Lorin Fulton, [facility] Triage/Med Refill 3/21/2020    Requested Prescriptions   Pending Prescriptions Disp Refills     ferrous sulfate 325 (65 FE) MG tablet 90 tablet 0     Sig: Take 1 tablet (325 mg total) by mouth daily with breakfast.       Iron Supplements Refill Protocol Failed - 3/21/2020 12:59 PM        Failed - Hemoglobin or Hematocrit in last 12 months     Hemoglobin   Date Value Ref Range Status   11/10/2016 13.8 12.0 - 16.0 g/dL Final     Hematocrit   Date Value Ref Range Status   11/10/2016 39.8 33.0 - 51.0 % Final             Passed - PCP or prescribing provider visit in past 12 months       Last office visit with prescriber/PCP: 1/10/2020 Leana Alvarado MD OR same dept: 1/10/2020 Leana Alvarado MD OR same specialty: 1/10/2020 Leana Alvarado MD  Last physical: 12/17/2018 Last MTM visit: Visit date not found   Next visit within 3 mo: Visit date not found  Next physical within 3 mo: Visit date not found  Prescriber OR PCP: Leana Alvarado MD  Last diagnosis associated with med order: 1. Anemia, unspecified type  - ferrous sulfate 325 (65 FE) MG tablet; Take 1 tablet (325 mg total) by mouth daily with breakfast.  Dispense: 90 tablet; Refill: 0    If protocol passes may refill for 12 months if within 3 months of last provider visit (or a total of 15 months).

## 2021-06-07 NOTE — TELEPHONE ENCOUNTER
Controlled Substance Refill Request  Medication Name:   Requested Prescriptions     Pending Prescriptions Disp Refills     methylphenidate HCl (CONCERTA) 54 MG CR tablet 30 tablet 0     Sig: Take 1 tablet (54 mg total) by mouth daily.     Date Last Fill: 2/18/20  [facility]  Submit electronically to pharmacy  Controlled Substance Agreement on file:   Encounter-Level CSA Scan Date:    There are no encounter-level csa scan date.        Last office visit:  1/10/20

## 2021-06-07 NOTE — TELEPHONE ENCOUNTER
RN cannot approve Refill Request    RN can NOT refill this medication med is not covered by policy/route to provider. Last office visit: 1/10/2020 Leana Alvarado MD Last Physical: 12/17/2018 Last MTM visit: Visit date not found Last visit same specialty: 1/10/2020 Leana Alvarado MD.  Next visit within 3 mo: Visit date not found  Next physical within 3 mo: Visit date not found      Lorin Fulton, [facility] Triage/Med Refill 3/21/2020    Requested Prescriptions   Pending Prescriptions Disp Refills     ferrous sulfate 325 (65 FE) MG tablet [Pharmacy Med Name: FERROUS SULFATE 325 MG TABLET] 90 tablet 0     Sig: TAKE 1 TABLET BY MOUTH EVERY DAY WITH BREAKFAST       Iron Supplements Refill Protocol Failed - 3/21/2020  9:12 AM        Failed - Hemoglobin or Hematocrit in last 12 months     Hemoglobin   Date Value Ref Range Status   11/10/2016 13.8 12.0 - 16.0 g/dL Final     Hematocrit   Date Value Ref Range Status   11/10/2016 39.8 33.0 - 51.0 % Final             Passed - PCP or prescribing provider visit in past 12 months       Last office visit with prescriber/PCP: 1/10/2020 Leana Alvarado MD OR same dept: 1/10/2020 Leana Alvarado MD OR same specialty: 1/10/2020 Leana Alvarado MD  Last physical: 12/17/2018 Last MTM visit: Visit date not found   Next visit within 3 mo: Visit date not found  Next physical within 3 mo: Visit date not found  Prescriber OR PCP: Leana Alvarado MD  Last diagnosis associated with med order: 1. Anemia, unspecified type  - ferrous sulfate 325 (65 FE) MG tablet [Pharmacy Med Name: FERROUS SULFATE 325 MG TABLET]; TAKE 1 TABLET BY MOUTH EVERY DAY WITH BREAKFAST  Dispense: 90 tablet; Refill: 0    If protocol passes may refill for 12 months if within 3 months of last provider visit (or a total of 15 months).

## 2021-06-08 NOTE — TELEPHONE ENCOUNTER
Refill request for medication: venlafexine  Last visit addressing this medication: 1/10/2020  Follow up plan 6  months  Last refill on 1/10/20, quantity #30 4 refills      Appointment: Not due     Elvi Petty LPN

## 2021-06-08 NOTE — PROGRESS NOTES
Developmental Behavioral Pediatrics Follow Up    Jimena is a 16 y.o. female who presents to the clinic today with her Parents to discuss  Anxiety, Depression and Medication Management.    Her last visit was on 11/10/16.  There were no medication changes made at that visit.    Phone Calls:  None    Presenting concerns:  Discuss a sleep study, medication and a referral for a counselor    Interval history:    Jimena is a 16 year old female who is struggling with depression and anxiety and this is affecting her school work. She is quite distracted and was initially concerned about ADHD. However, her neuropsych evaluation suggested that the symptoms were most consistent with anxiety, depression and OCD. Discussed that these can cause symptoms that are similar to ADHD in school. She has trouble with concentrating in school. No SI or HI. She has had thoughts of suicide in the past, but none currently.    She also has significant difficulty with fatigue and sleepiness. She sleeps 10-12 hours at night and still feels the need to take a nap during the day. She doesn't have snoring since she had a T&A. She does seem to sleep well at night, but is quite tired during the day without a nap. It was recommended that she have a sleep study to investigate this.     She does seem a psychologist weekly at school. She finds this helpful. She doesn't have an outside psychologist, but her psychologist is not available in the summer. She is wondering if she needs another option at this time.     Parents also have questions about the [facility] unit for treatment of depression. They would like to know if this is beneficial.    Currently receiving the following school services in the process of starting a 504b.    FAMILY SYSTEM AND SOCIAL HISTORY  Jimena lives with Parents.      Current Outpatient Prescriptions   Medication Sig     sertraline (ZOLOFT) 25 MG tablet Take 1 tablet (25 mg total) by mouth daily. Can increase to 2  tablets daily in 1 week if needed.       CURRENT HEALTH    NUTRITION:  eats well, variety of foods  SLEEP HABITS: Sleeps well.  No problems falling or staying asleep.      REVIEW OF SYSTEMS  Cardiovascular:  no chest pain or dyspnea on exertion  Respiratory:  no cough, shortness of breath, or wheezing  GI:  no abdominal pain, change in bowel habits, or black or bloody stools  :  negative  Musculoskeletal:  negative  Integumentary:  negative  Endocrine:  negative  Neurological:  negative    OBJECTIVE INFORMATION  Awake and alert.  Engaged in conversation at times.  Well groomed.  GEN: alert, well appearing  CVS: RRR, no murmur  LUNGS: clear, no increased work of breathing          IMPRESSION    Jimena has anxiety, depression and OCD per neuropsych testing as scanned in the chart. She is struggling with the depression and is interested in medications to help with this. She does have a counselor at school that she meets with weekly, but doesn't have an outside counselor that can continue in the summer.   1. Recommend establishing with a counselor outside of school as well. Referral was entered.  2. Sleep medicine evaluation was ordered. Discussed that some of the fatigue symptoms could be related to the depression and anxiety as well. However, could investigate this further with the sleep consult and testing.   3. Discussed anxiety and depression medications at length including risks and benefits. Discussed the black box warning. Discussed side effects. Recommend a trial of sertraline to help with her depression and anxiety symptoms. Would start with 25 mg daily and increase to 50 mg daily after 1 week if she is tolerating this well.  4. Parents have questions about the be[employer] device - will investigate this and call the family.   5. Contracted for safety for Jimena. She will talk to her mother or her counselor at school if she is having thoughts of hurting herself or feeling her mood is  worsening.    Return to clinic in 1 month(s).    A total of 50 minutes was spent in face to face contact with the patient and family.  Greater than 50% of the time was spent in education, counseling, coordination of care and medical decision making related to the above issues.

## 2021-06-09 NOTE — TELEPHONE ENCOUNTER
Pt called, asking when she can start taking  her birth control bill( Sprintec). Pt was told she was suppose to  start yeasterday. Pt asked how may tablets can she take since she missed a dose yesterday. Edson called [facility] with pt on the line, and was told to take two since she missed it yesterday. Pt sated she didn't have intercourse the last five days.  Daniel Bermudez RN  COVID 19 Nurse Triage Plan/Patient Instructions    Please be aware that novel coronavirus (COVID-19) may be circulating in the community. If you develop symptoms such as fever, cough, or SOB or if you have concerns about the presence of another infection including coronavirus (COVID-19), please contact your health care provider or visit [email or web address].     Disposition/Instructions    Patient to stay at home and follow home care protocol based instructions.    Thank you for taking steps to prevent the spread of this virus.  o Limit your contact with others.  o Wear a simple mask to cover your cough.  o Wash your hands well and often.    Resources    [facility]: About COVID-19: [email or web address]    CDC: What to Do If You're Sick: www.cdc.gov/coronavirus/2019-ncov/about/steps-when-sick.html    CDC: Ending Home Isolation: www.cdc.gov/coronavirus/2019-ncov/hcp/disposition-in-home-patients.html     CDC: Caring for Someone: www.cdc.gov/coronavirus/2019-ncov/if-you-are-sick/care-for-someone.html     [facility]: Interim Guidance for Hospital Discharge to Home: www.health.[facility].[place]/diseases/coronavirus/hcp/hospdischarge.pdf    [facility] clinical trials (COVID-19 research studies): [email or web address]/[email or web address]     Below are the COVID-19 hotlines at the [place] Department of Health ([facility]). Interpreters are available.   o For health questions: Call 284-055-0894 or 1-495.322.4064 (7 a.m. to 7 p.m.)  o For questions about schools and childcare: Call 722-978-2702 or 1-705.359.9918 (7 a.m. to 7 p.m.)  "      Reason for Disposition    Missed 1 or more pills, questions about    Additional Information    Negative: [1] Vaginal bleeding AND [2] not on birth control pills    Negative: Vaginal discharge (other than bleeding) is main symptom    Negative: New (or worsening) headaches    Negative: [1] SEVERE pain (e.g., excruciating) AND [2] present > 1 hour    Negative: [1] Pregnant AND [2] MODERATE-SEVERE abdominal pain    Negative: [1] Pregnant AND [2] MODERATE-SEVERE vaginal bleeding    Negative: SEVERE vaginal bleeding (i.e., soaking 2 pads or tampons per hour and present 2 or more hours)    Negative: Chest pain or shortness of breath    Negative: Patient sounds very sick or weak to the triager    Negative: [1] Constant abdominal pain AND [2] present > 2 hours    Negative: DVT suspected (teen with unilateral painful thigh or calf AND edema distal to pain)    Negative: [1] Pregnant AND [2] MILD symptoms (e.g., vaginal spotting, mild abdominal cramping)    Negative: Caller has urgent question and triager unable to answer question    Negative: MODERATE vaginal bleeding (i.e., soaking 1 pad or tampon per hour and present > 6 hours)    Negative: Caller requests refill for birth control pills    Negative: Caller has NON-URGENT question and triager unable to answer question    Negative: [1] Recent unprotected sex (within last 5 days) AND [2] history of missed pills/inconsistent pill use    Negative: [1] Pregnant AND [2] NO vaginal bleeding or abdominal pain    Negative: [1] Vaginal bleeding with > 6 soaked pads or tampons per day AND [2] lasts > 7 days    Negative: Caller is concerned about a sexually transmitted infection (STI)    Negative: Missed 3 or more \"active\" pills in a cycle    Negative: [1] Vaginal bleeding with > 6 soaked pads or tampons per day BUT [2] lasts 7 days or less    Negative: Vaginal bleeding lasts > 7 days    Negative: [1] No monthly bleeding AND [2] stopped taking birth control pills 6 or more months " ago    Negative: [1] Bleeding or spotting between regular periods AND [2] continues more than 3 months on birth control pills    Negative: [1] Irregular vaginal bleeding or spotting AND [2] taking birth control pills and has missed doses    Negative: [1] Irregular vaginal bleeding or spotting AND [2] taking birth control pills with NO missed doses    Negative: [1] No monthly bleeding (or irregular periods) AND [2] stopped taking birth control pills < 6 months ago    Negative: [1] No monthly bleeding AND [2] taking progestin-only birth control pills    Protocols used: CONTRACEPTION - BIRTH CONTROL PILLS-P-

## 2021-06-09 NOTE — TELEPHONE ENCOUNTER
I spoke with Pharmacist and she quoted me $153.42 for Concerta. $48.95 for the Focalin both are generics for these. I called and left Nadia a message, I will send her a [employer] message regarding this as well.     Eun Hightower, A

## 2021-06-09 NOTE — PROGRESS NOTES
"Developmental Behavioral Pediatrics Follow Up    Jimena is a 16 y.o. female who presents to the clinic today with her Dad to discuss  Anxiety, Depression and Medication Management.    Her last visit was on 2/8/17.  There were medication changes made at that visit.    Phone Calls:  Updated that she was having some slight progress with sertraline 25 mg, but not significantly improved, so increased to 50 mg daily on 3/10/17.    Presenting concerns:  Medication check and follow up sleep study.    Interval history:  Nadia has been doing well since her last visit. She has noticed some benefits and improvement since starting the sertraline. She does feel that she is less \"angry\" than she was before. She is finding more enjoyment in activities. She does still get frustrated, but it is improved. She does continue to feel nervous and anxious frequently. She doesn't feel this has impacted that as much yet. She is not having any thoughts of HI or SI. She continues to do well in school.    She continues to struggle with her sleep. She did see sleep medicine. They found that she doesn't get sufficient sleep, She also doesn't have quality sleep when she is sleeping. She has an irregular sleep pattern. She has frequent wakening as night. The family declined medication for sleep at this time. It is felt that her poor sleep is likely worsening her mood and anxiety/depression symptoms. She will be working with a sleep therapist this summer to help with her sleep as well.     FAMILY SYSTEM AND SOCIAL HISTORY  Jimena lives with Parents.      Current Outpatient Prescriptions   Medication Sig     sertraline (ZOLOFT) 100 MG tablet Take 1 tablet (100 mg total) by mouth daily. Can increase to 2 tablets daily in 1 week if needed.       CURRENT HEALTH    NUTRITION:  eats well, variety of foods  SLEEP HABITS: Has difficulty falling asleep at night. Doesn't feel well rested.      REVIEW OF SYSTEMS  Cardiovascular:  no chest pain or dyspnea " on exertion  Respiratory:  no cough, shortness of breath, or wheezing  GI:  no abdominal pain, change in bowel habits, or black or bloody stools  :  negative  Musculoskeletal:  negative  Integumentary:  negative  Endocrine:  negative  Neurological:  negative    OBJECTIVE INFORMATION  Awake and alert.  Engaged in conversation at times.  Well groomed.  GEN: alert, well appearing  CVS: RRR, no murmur  LUNGS: clear, no increased work of breathing          IMPRESSION  Some response from medication, but would benefit from medication adjustment. and Family will call with any questions or concerns before the next visit.  Recommend increasing the sertraline to 100 mg daily to try to optimize the dose of medication. Discussed possible side effects. Monitor for side effects.    Follow up with sleep clinic this summer as planned to help try to optimize her sleep.    Return to clinic in 3 month(s).    A total of 30 minutes was spent in face to face contact with the patient and family.  Greater than 50% of the time was spent in education, counseling, coordination of care and medical decision making related to the above issues.

## 2021-06-09 NOTE — TELEPHONE ENCOUNTER
Ferrous sulfate and Birth control pended.     Refill request for medication: Focalin  Last visit addressing this medication: 1/10/2020  Follow up plan 6  months  Last refill on 2/18/20, quantity #30   CSA completed Not UTD- 3/13/19   checked  06/22/20, last dispensed refill 2/18/20    Appointment: Not due     Bettye Stuart LPN

## 2021-06-10 NOTE — TELEPHONE ENCOUNTER
Refill request for medication: dexmethylphenidate 25 mg MP50  Last visit addressing this medication: 1/10/20  Follow up plan 6  months  Last refill on 6/22/20, quantity #30   CSA completed 3/13/2019   checked  07/27/20, last dispensed refill 6/22/20    Appointment: Left message for patient     Elvi Petty LPN

## 2021-06-10 NOTE — TELEPHONE ENCOUNTER
Controlled Substance Refill Request  Medication Name:   Requested Prescriptions     Pending Prescriptions Disp Refills     dexmethylphenidate 25 mg MP50 30 capsule 0     Sig: Take 25 mg by mouth daily.     Date Last Fill: 4/16/20  [facility]  Submit electronically to pharmacy  Controlled Substance Agreement on file:   Encounter-Level CSA Scan Date:    There are no encounter-level csa scan date.        Last office visit:  1/10/20

## 2021-06-11 NOTE — PROGRESS NOTES
"Jimena Werner is a 19 y.o. female who is being evaluated via a billable video visit.      The patient has been notified of following:     \"This video visit will be conducted via a call between you and your physician/provider. We have found that certain health care needs can be provided without the need for an in-person physical exam.  This service lets us provide the care you need with a video conversation.  If a prescription is necessary we can send it directly to your pharmacy.  If lab work is needed we can place an order for that and you can then stop by our lab to have the test done at a later time.    Video visits are billed at different rates depending on your insurance coverage. Please reach out to your insurance provider with any questions.    If during the course of the call the physician/provider feels a video visit is not appropriate, you will not be charged for this service.\"    Patient has given verbal consent to a Video visit? Yes  How would you like to obtain your AVS? AVS Preference: MyChart.  If dropped by the video visit, the video invitation should be sent to: Send to e-mail at: [email or web address]  Will anyone else be joining your video visit? No        Video Start Time: 2:48 pm    Additional provider notes:  ASSESSMENT/PLAN:  1. Attention deficit hyperactivity disorder (ADHD), other type  Jimena has ADHD. She is having difficulty with too much focus and increased difficulty with picking. Will try to decrease her focalin to 20 mg daily from 25 mg daily. Will call to cancel the prescription for 25 mg daily that was sent on Monday as she hasn't picked this up. Will continue to monitor. Follow up in 6 months for a medication check.   - dexmethylphenidate (FOCALIN XR) 20 MG 24 hr capsule; Take 1 capsule (20 mg total) by mouth daily.  Dispense: 30 capsule; Refill: 0    2. Current moderate episode of major depressive disorder, unspecified whether recurrent (H)  3. Social anxiety " disorder  Jimena is a 19 year old female with depression and social anxiety disorder. She is well controlled at this time. Continue effexor 107.5 mg. Encouraged her to follow up with her psychologist as planned as well.   - venlafaxine (EFFEXOR-XR) 37.5 MG 24 hr capsule; Take 1 capsule (37.5 mg total) by mouth daily.  Dispense: 90 capsule; Refill: 1  - venlafaxine (EFFEXOR-XR) 75 MG 24 hr capsule; Take 1 capsule (75 mg total) by mouth daily.  Dispense: 90 capsule; Refill: 1    4. Encounter for surveillance of contraceptive pills  5. Premenstrual dysphoric disorder  Jimena is a 19 year old female with PMDD. She is doing well with her sprintec birth control. This was refilled today.   - norgestimate-ethinyl estradioL (SPRINTEC, 28,) 0.25-35 mg-mcg per tablet; Take 1 tablet by mouth daily.  Dispense: 84 tablet; Refill: 4          There are no Patient Instructions on file for this visit.    No orders of the defined types were placed in this encounter.    Medications Discontinued During This Encounter   Medication Reason     methylphenidate HCl (CONCERTA) 54 MG CR tablet      dexmethylphenidate 25 mg MP50      venlafaxine (EFFEXOR-XR) 37.5 MG 24 hr capsule Reorder     norgestimate-ethinyl estradioL (SPRINTEC, 28,) 0.25-35 mg-mcg per tablet Reorder     venlafaxine (EFFEXOR-XR) 75 MG 24 hr capsule Reorder       Return in about 6 months (around 3/16/2021) for medication check, Annual physical.    CHIEF COMPLAINT:  Chief Complaint   Patient presents with     Medication check     focalin- supposed to increase focus.  Feels like some of her habits like skin picking and feels like she is doing it more with focalin. Should the dose be decreased or something that she should work on       HISTORY OF PRESENT ILLNESS:  Jimena is a 19 y.o. female presenting today for a medication check. Jimena has been doing well since her last visit. She feels her mood has been good overall. No thoughts of hurting herself or not wanting  to be around. She notes that her anxiety has improved. She is happy to be back at school now. She is living with 2 friends in a suite at school. Most of her classes are online, but she has one class in person. Classes are going well so far. She is setting up an appointment with her counselor as well.     Nadia feels she is doing well with her attention and focus. However, she does feel that she is too focused sometimes. In particular she has found that she is picking at her skin more. She has trouble stopping this. She focuses on this too much. She is wondering if she needs to decrease her focalin a little.     She is doing well with her birth control pills. No side effects noted. Her periods are regular.    REVIEW OF SYSTEMS:   Review of Symptoms: History obtained from the patient.    All other systems are negative.    PFSH:      No past medical history on file.    Family History   Problem Relation Age of Onset     Hypothyroidism Maternal Grandmother        Past Surgical History:   Procedure Laterality Date     IA REMOVAL OF TONSILS,<11 Y/O      Description: Tonsillectomy;  Recorded: 03/03/2010;       TOBACCO USE:  Social History     Tobacco Use   Smoking Status Never Smoker   Smokeless Tobacco Never Used       VITALS:  There were no vitals filed for this visit.  Wt Readings from Last 3 Encounters:   01/10/20 102 lb 14.4 oz (46.7 kg)   06/17/19 102 lb 12.8 oz (46.6 kg) (6 %, Z= -1.57)*   06/05/19 103 lb 8 oz (46.9 kg) (7 %, Z= -1.51)*     * Growth percentiles are based on CDC (Girls, 2-20 Years) data.     There is no height or weight on file to calculate BMI.    PHYSICAL EXAM:    GENERAL: Healthy, alert and no distress  EYES: Eyes grossly normal to inspection. No discharge or erythema, or obvious scleral/conjunctival abnormalities.  RESP: No audible wheeze, cough, or visible cyanosis.  No visible retractions or increased work of breathing.    NEURO: Cranial nerves grossly intact. Mentation and speech appropriate  for age.  PSYCH: Mentation appears normal, affect normal/bright, judgement and insight intact, normal speech and appearance well-groomed      Video-Visit Details    Type of service:  Video Visit    Video End Time (time video stopped): 3:02 PM  Originating Location (pt. Location): [facility] room [facility]    Distant Location (provider location):  [facility]     Platform used for Video Visit: Yu Alvarado MD

## 2021-06-11 NOTE — TELEPHONE ENCOUNTER
Refill request for medication: Focalin  Last visit addressing this medication: 1/10/20  Follow up plan 6  months  Last refill on 7.27.20, quantity #30   CSA completed 3/13/2019   checked  09/14/20, last dispensed refill 7/27/20    Appointment: Appointment scheduled for 9/16/20- kinza Petty LPN

## 2021-06-12 NOTE — TELEPHONE ENCOUNTER
Refill request for medication: Focalin   Last visit addressing this medication: 9/16/20  Follow up plan 6  months  Last refill on 9/16/20, quantity #30   CSA completed    checked  11/03/20, last dispensed refill 9/16/20    Appointment: Not due     Elvi Petty LPN

## 2021-06-12 NOTE — TELEPHONE ENCOUNTER
Controlled Substance Refill Request  Medication Name:   Requested Prescriptions     Pending Prescriptions Disp Refills     dexmethylphenidate (FOCALIN XR) 20 MG 24 hr capsule 30 capsule 0     Sig: Take 1 capsule (20 mg total) by mouth daily.     Date Last Fill: 9/16/20  Requested Pharmacy: [facility]  Submit electronically to pharmacy  Controlled Substance Agreement on file:   Encounter-Level CSA Scan Date:    There are no encounter-level csa scan date.        Last office visit:  9/16/20

## 2021-06-13 NOTE — TELEPHONE ENCOUNTER
Refill request for medication: dexmethylphenidate 20 mg tab   Last visit addressing this medication: 09/16/2020  Follow up plan 6  months  Last refill on 11/03/2020, quantity #30   CSA completed 03/14/2019   checked  Do not have access     Appointment: Not due     Elicia Lockwood LPN

## 2021-06-13 NOTE — TELEPHONE ENCOUNTER
Controlled Substance Refill Request  Medication Name:   Requested Prescriptions     Pending Prescriptions Disp Refills     dexmethylphenidate (FOCALIN XR) 20 MG 24 hr capsule 30 capsule 0     Sig: Take 1 capsule (20 mg total) by mouth daily.     Date Last Fill: 11/3/20  [facility]  Submit electronically to pharmacy  Controlled Substance Agreement on file:   Encounter-Level CSA Scan Date:    There are no encounter-level csa scan date.        Last office visit:  9/16/20  Lorin Fulton RN, MA  [facility]    Triage Nurse Advisor

## 2021-06-14 NOTE — PROGRESS NOTES
ASSESSMENT/PLAN:  1. Anxiety  2. Depression  She has depression and anxiety, but they have been improved since starting medication. She  Is doing well with her current dose of sertraline at this time. Will continue the sertraline 100 mg daily. Will continue to monitor this. Follow up in 6 months or sooner as needed.    3. Difficulty concentrating  Jimena has difficulty concentrating and is concerned about possible ADHD. She has been working with [facility], and has noticed improvement. However, she feels that the concentration continues to be difficult. She has also seen a psychologist who recommended considering this. She was given [place] and ADHD rating scales to complete. They will return for an ADHD evaluation after these are complete to discuss if medications would be indicated.    There are no Patient Instructions on file for this visit.    Orders Placed This Encounter   Procedures     Influenza, Seasonal,Quad Inj, 36+ MOS (multi-dose vial)     Order Specific Question:   Counseling provided to include answering patients questions and/or preemptively discussing the risks and benefits of all components.     Answer:   Yes     Medications Discontinued During This Encounter   Medication Reason     sertraline (ZOLOFT) 100 MG tablet Reorder       Return in about 6 months (around 5/28/2018) for Recheck.    CHIEF COMPLAINT:  Chief Complaint   Patient presents with     Follow-up     medication, would like to go on ADHD       HISTORY OF PRESENT ILLNESS:  Jimena is a 17 y.o. female presenting to the clinic today to discuss possible ADHD medications. She is accompanied by her father. She continues to struggle focusing in school and has been discussing it with her therapist, who suggested that she think about going on ADHD medication. She is no longer seeing her school psychologist and has established care with an independent therapist. She has not completed any ADHD evaluations at school. Her father says that in  "July 2017 she started attending sessions at [employer] in [place], which has helped her focus and be able to read for longer amounts of time. She was hoping that these tutoring sessions would completely resolve her inability to focus and she would not have to try medication.     Anxiety and Depression: She continues to take 100 mg of Zoloft daily, which is managing her mood well. She feels like this current dose is working well enough. She feels her mood is better than it was a year ago. She still experiences fluctuations in her mood but is able to manage them.     REVIEW OF SYSTEMS:   All other systems are negative.     PFSH:  Social: She is in her senior year of high school and has a later 9:00 am start this year. She has cut out some of her extracurricular activities and feels she can stick to a better daily schedule. She plans to apply to colleges in the [facility] [employer] [facility] [employer] or [place]. She would like to study art and/or science. Her father says her art is self-taught and he supports her in whatever career path she wants to follow. Her father encourages her to branch off into undiscovered territory.   TOBACCO USE:   History   Smoking Status     Never Smoker   Smokeless Tobacco     Never Used       VITALS:   Vitals:    11/28/17 1450   BP: 100/70   Patient Site: Left Arm   Patient Position: Sitting   Cuff Size: Adult Regular   Pulse: 87   Weight: 116 lb 8 oz (52.8 kg)   Height: 5' 2.75\" (1.594 m)     Wt Readings from Last 3 Encounters:   11/28/17 116 lb 8 oz (52.8 kg) (34 %, Z= -0.42)*   03/28/17 114 lb 3.2 oz (51.8 kg) (32 %, Z= -0.47)*   02/08/17 117 lb 9.6 oz (53.3 kg) (40 %, Z= -0.25)*     * Growth percentiles are based on CDC 2-20 Years data.     Body mass index is 20.8 kg/(m^2).    PHYSICAL EXAM:  Constitutional: She appears well-developed and well-nourished.   Neck: Normal ROM, no tenderness  CV: RRR, no murmurs  Lungs; Clear to auscultation bilaterally without wheezes, rales or rhonci.  Neurological: She is alert. She " has normal reflexes. Gait normal.   Psychiatric: She has a normal mood and affect. Her speech is normal and behavior is normal.    ADDITIONAL HISTORY SUMMARIZED (2): None.   DECISION TO OBTAIN EXTRA INFORMATION (1): None.   RADIOLOGY TESTS (1): None.   LABS (1): None.  MEDICINE TESTS (1): None.   INDEPENDENT REVIEW (2 each): None.     The visit lasted a total of 11 minutes face to face with the patient. Over 50% of the time was spent counseling and educating the patient about possible ADHD medications and anxiety.     I, Alexandra Severson, am scribing for and in the presence of Dr Leana Alvarado.     Leana VICENTE MD , personally performed the services described in this documentation, as scribed by Alexandra Severson in my presence, and it is both accurate and complete.     MEDICATIONS:   Current Outpatient Prescriptions   Medication Sig Dispense Refill     sertraline (ZOLOFT) 100 MG tablet TAKE 1 TABLET BY MOUTH DAILY 90 tablet 1     No current facility-administered medications for this visit.         Total data points: 0

## 2021-06-15 PROBLEM — F32.9 MAJOR DEPRESSIVE DISORDER WITH CURRENT ACTIVE EPISODE: Status: ACTIVE | Noted: 2017-03-28

## 2021-06-15 PROBLEM — F40.10 SOCIAL ANXIETY DISORDER: Status: ACTIVE | Noted: 2017-03-28

## 2021-06-15 NOTE — TELEPHONE ENCOUNTER
Sent through for another month of medication. It looks like Dr. Alvarado wanted her to follow up for a med check (okay to combine with physical) in March.

## 2021-06-15 NOTE — TELEPHONE ENCOUNTER
Controlled Substance Refill Request  Medication Name:   Requested Prescriptions     Pending Prescriptions Disp Refills     dexmethylphenidate (FOCALIN XR) 20 MG 24 hr capsule 30 capsule 0     Sig: Take 1 capsule (20 mg total) by mouth daily.     Date Last Fill: 12/21/20  Requested [facility]acy: [facility]  Submit electronically to pharmacy  Controlled Substance Agreement on file:   Encounter-Level CSA Scan Date:    There are no encounter-level csa scan date.        Last office visit:  9/16/20  Lorin Fulton RN, MA  [facility]    Triage Nurse Advisor

## 2021-06-15 NOTE — TELEPHONE ENCOUNTER
Incoming fax from pharmacy for dexmethylphenidate (FOCALIN XR) 20 MG 24 hr capsule.  Please initiate PA process.    Key: [id number]    Last name: Alphonso    : 2000    Karolyn Frankel

## 2021-06-15 NOTE — TELEPHONE ENCOUNTER
Central PA team  [phone]  Pool: HE PA MED ([id number])          PA has been initiated.       PA form completed and faxed insurance via Cover My Meds     Key:  [id number]     Medication:  DEXMETHYLPHENIDATE ER 20MG    Insurance:  [employer]         Response will be received via fax and may take up to 5-10 business days depending on plan

## 2021-06-15 NOTE — PROGRESS NOTES
Jimena Werner is a 20 y.o. female who is being evaluated via a billable video visit.      How would you like to obtain your AVS? MyChart.  If dropped from the video visit, the video invitation should be resent by: Text to cell phone: [phone]  Will anyone else be joining your video visit? No      Video Start Time: 3:54 PM    Assessment & Plan     Attention deficit hyperactivity disorder (ADHD), other type  Jimena is a 20 year old female with ADHD. She is doing well with her Focalin XR 20 mg daily. She feels her attention is improved. She does continue to have some difficulty with her appetite, but is able to eat. She does have some trouble with sleep but is not staying awake due to the Focalin. Will continue Focalin XR 20 mg daily. Insurance needed a prior auth but this has been completed.    was checked today and there are no concerning refills.  - dexmethylphenidate (FOCALIN XR) 20 MG 24 hr capsule  Dispense: 30 capsule; Refill: 0  - AMB REFERRAL TO MENTAL HEALTH AND ADDICTION  - Adult (18+); Outpatient Treatment; Individual/Couples/Family/Group Therapy/Health Psychology; [facility]; Any Clinic Location; We will contact you to schedule the appointment or plea...    Current moderate episode of major depressive disorder, unspecified whether recurrent (H)  Jimena is a 20 year old female with depression. She is noting improvement with venlafaxine. She does feel her mood has improved with returning to school and her current living situation. No SI. She does request a referral for psychology. This was entered today. Will continue her current dose of venlafaxine.  - venlafaxine (EFFEXOR-XR) 75 MG 24 hr capsule  Dispense: 90 capsule; Refill: 1  - venlafaxine (EFFEXOR-XR) 37.5 MG 24 hr capsule  Dispense: 90 capsule; Refill: 1  - AMB REFERRAL TO MENTAL HEALTH AND ADDICTION  - Adult (18+); Outpatient Treatment; Individual/Couples/Family/Group Therapy/Health Psychology; [facility]; Any Clinic Location; We will contact you to schedule the appointment or plea...    Social anxiety disorder  Jimena has social anxiety. She does continue to work through behavioral changes. She is interested in establishing care with a counselor for virtual counseling sessions. Referral was entered for this. She does feel that the venlafaxine has been helping. Will continue her current dose of medication.  - venlafaxine (EFFEXOR-XR) 75 MG 24 hr capsule  Dispense: 90 capsule; Refill: 1  - venlafaxine (EFFEXOR-XR) 37.5 MG 24 hr capsule  Dispense: 90 capsule; Refill: 1  - AMB REFERRAL TO MENTAL HEALTH AND ADDICTION  - Adult (18+); Outpatient Treatment; Individual/Couples/Family/Group Therapy/Health Psychology; [facility]; Any Clinic Location; We will contact you to schedule the appointment or plea...    Encounter for surveillance of contraceptive pills  She has been doing well with her periods with sprintec. Will continue sprintec daily. This was refilled.  - norgestimate-ethinyl estradioL (SPRINTEC, 28,) 0.25-35 mg-mcg per tablet  Dispense: 84 tablet; Refill: 4        27 minutes spent on the date of the encounter doing chart review, history and exam, documentation and further activities as noted above       Return in about 6 months (around 9/10/2021) for Annual physical, or sooner if symptoms worsen or fail to improve.    Leana Alvarado MD  [facility]   Jimena Werner is 20 y.o. and presents today for the following health issues   HPI   Jimena is a 20 year old female with depression and anxiety. She notes that she is doing better overall. She attributes this to her newer living situation. She is doing well at school. She is keeping up with her school work. She has support at school. She is taking her medication. She does feel her current dose of venlafaxine is helping with her depression and anxiety.     She is also  taking Focalin XR for her ADHD symptoms. She does feel these are well controlled during the day. Her appetite is diminished, but she does eat. She denies weight loss or changes in how her clothes are fitting. She does have some difficulty with sleeping, but feels this is ok overall.     Depression and Anxiety Follow-Up    How are you doing with your depression since your last visit?: Better    How are you doing with your anxiety since your last visit?: Better    Are you having other symptoms that might be associated with depression or anxiety?: Yes:  difficulty sleeping and feeling tired.    Have you had a significant life event?: No     Do you have any concerns with your use of alcohol or other drugs?: No    Social History     Tobacco Use     Smoking status: Never Smoker     Smokeless tobacco: Never Used   Substance Use Topics     Alcohol use: Yes     Comment: occasional drink with friends, no binge drinking     Drug use: No       PHQ-9 DEPRESSION SCALE 3/10/2021   Little interest or pleasure in doing things 1   Feeling down, depressed, or hopeless 0   Trouble falling or staying asleep, or sleeping too much 3   Feeling tired or having little energy 3   Poor appetite or overeating 1   Feeling bad about yourself - or that you are a failure or have let yourself or your family down 1   Trouble concentrating on things, such as reading the newspaper or watching television 1   Moving or speaking so slowly that other people could have noticed. Or the opposite - being so fidgety or restless that you have been moving around a lot more than usual 0   Thoughts that you would be better off dead, or of hurting yourself in some way 0   PHQ-9 Total Score 10   If you checked off any problems, how difficult have these problems made it for you to do your work, take care of things at home, or get along with other people? Very difficult   Some recent data might be hidden       SANTIAGO-7 Screening Results:  How difficult did these problems  make it for you to do your work, take care of things at home or get along with other people? : Very difficult (3/10/2021  3:00 PM)  Feeling nervous, anxious, or on edge: 1 (3/10/2021  3:00 PM)  Not being able to stop or control worryin (3/10/2021  3:00 PM)  Worrying too much about different things: 1 (3/10/2021  3:00 PM)  Trouble relaxin (3/10/2021  3:00 PM)  Being so restless that it's hard to sit still: 2 (3/10/2021  3:00 PM)  Becoming easily annoyed or irritable: 1 (3/10/2021  3:00 PM)  Feeling afraid as if something awful might happen: 1 (3/10/2021  3:00 PM)  SANTIAGO 7 Total Score: 8 (3/10/2021  3:00 PM)  How difficult did these problems make it for you to do your work, take care of things at home or get along with other people? : Very difficult (3/10/2021  3:00 PM)    In the past two weeks have you had thoughts of suicide or self-harm?  No.    Do you have concerns about your personal safety or the safety of others?   No    Suicide Assessment Five-step Evaluation and Treatment (SAFE-T)    How many servings of fruits and vegetables do you eat daily?  2-3    On average, how many sweetened beverages do you drink each day (Examples: soda, juice, sweet tea, etc.  Do NOT count diet or artificially sweetened beverages)?   1    How many days per week do you exercise enough to make your heart beat faster? 3 or less    How many minutes a day do you exercise enough to make your heart beat faster? 10 - 19    How many days per week do you miss taking your medication? 0      ]      Objective    Vitals - Patient Reported  Weight (Patient Reported): 107 lb (48.5 kg)    Physical Exam  General: Well appearing, NAD  Psych: Normal mood and behavior. Interactive. Well groomed.            Video-Visit Details    Type of service:  Video Visit    Video End Time (time video stopped): 4:15 pm  Originating Location (pt. Location): School    Distant Location (provider location):  [facility]  used for Video Visit: Yu

## 2021-06-16 PROBLEM — F32.81 PREMENSTRUAL DYSPHORIC DISORDER: Status: ACTIVE | Noted: 2019-06-17

## 2021-06-16 PROBLEM — Z79.899 CONTROLLED SUBSTANCE AGREEMENT SIGNED: Status: ACTIVE | Noted: 2018-06-18

## 2021-06-16 PROBLEM — F32.1 MODERATE MAJOR DEPRESSION (H): Status: ACTIVE | Noted: 2020-01-11

## 2021-06-16 PROBLEM — F90.8 ATTENTION DEFICIT HYPERACTIVITY DISORDER (ADHD), OTHER TYPE: Status: ACTIVE | Noted: 2018-06-06

## 2021-06-17 NOTE — TELEPHONE ENCOUNTER
RN cannot approve Refill Request    RN can NOT refill this medication  RX is not on protocol (age 21 and younger), also needs Labs and Blood Pressure Check.. Last office visit: 1/10/2020 Leana Alvarado MD Last Physical: 12/17/2018 Last MTM visit: Visit date not found Last visit same specialty: 1/10/2020 Leana Alvarado MD.  Next visit within 3 mo: Visit date not found  Next physical within 3 mo: Visit date not found      Yee Ramos, [facility] Triage/Med Refill 5/12/2021    Requested Prescriptions   Pending Prescriptions Disp Refills     venlafaxine (EFFEXOR-XR) 37.5 MG 24 hr capsule [Pharmacy Med Name: VENLAFAXINE ER 37.5MG CAPSULES] 90 capsule 1     Sig: TAKE 1 CAPSULE(37.5 MG) BY MOUTH DAILY       Venlafaxine/Desvenlafaxine Refill Protocol Failed - 5/12/2021  4:24 PM        Failed - LFT or AST or ALT in last year     No results found for: ALBUMIN, BILITOT, BILIDIR, ALKPHOS, AST, ALT, PROT             Failed - Fasting lipid cascade in last year     Cholesterol   Date Value Ref Range Status   12/17/2018 151 <=199 mg/dL Final     Triglycerides   Date Value Ref Range Status   12/17/2018 56 <=149 mg/dL Final     HDL Cholesterol   Date Value Ref Range Status   12/17/2018 59 >=50 mg/dL Final     LDL Calculated   Date Value Ref Range Status   12/17/2018 81 <=129 mg/dL Final     Patient Fasting > 8hrs?   Date Value Ref Range Status   12/17/2018 Yes  Final             Failed - Blood Pressure in last year     BP Readings from Last 1 Encounters:   01/10/20 102/72             Failed - Age 21 and younger route to prescribing provider     Last office visit with prescriber/PCP: 1/10/2020 Leana Alvarado MD OR same dept: Visit date not found OR same specialty: 1/10/2020 Leana Alvarado MD  Last physical: 12/17/2018 Last MTM visit: Visit date not found   Next visit within 3 mo: Visit date not found  Next physical within 3 mo: Visit date not found  Prescriber OR PCP: Leana  Jana Alvarado MD  Last diagnosis associated with med order: 1. Current moderate episode of major depressive disorder, unspecified whether recurrent (H)  - venlafaxine (EFFEXOR-XR) 37.5 MG 24 hr capsule [Pharmacy Med Name: VENLAFAXINE ER 37.5MG CAPSULES]; TAKE 1 CAPSULE(37.5 MG) BY MOUTH DAILY  Dispense: 90 capsule; Refill: 1    2. Social anxiety disorder  - venlafaxine (EFFEXOR-XR) 37.5 MG 24 hr capsule [Pharmacy Med Name: VENLAFAXINE ER 37.5MG CAPSULES]; TAKE 1 CAPSULE(37.5 MG) BY MOUTH DAILY  Dispense: 90 capsule; Refill: 1    If protocol passes may refill for 12 months if within 3 months of last provider visit (or a total of 15 months).

## 2021-06-17 NOTE — TELEPHONE ENCOUNTER
Refill request for medication:   Requested Prescriptions     Pending Prescriptions Disp Refills     dexmethylphenidate (FOCALIN XR) 20 MG 24 hr capsule 30 capsule 0     Sig: Take 1 capsule (20 mg total) by mouth daily.       Last visit addressing this medication: 3/10/21  Follow up plan 6  months  Last refill on 4/8/21, quantity #30   CSA completed 3/13/2019  Date PDMP was last reviewed not reviewed    Appointment: Not due   Appointment recommended at least every 3 months for opioid prescriptions. Appointment recommended at least every 6 months for ADHD medications.    Elvi Petty LPN

## 2021-06-17 NOTE — PROGRESS NOTES
ASSESSMENT:  1. Anxiety  2. Depression  Jimena has had improvement in her anxiety and depression symptoms since starting sertraline. However, she has recently had more difficulty controlling her anxiety. She has been having more difficulty with school. She is nearly failing a class, which would cause difficulty with graduation. She continues to struggle with sleep, but is working to continue the schedule recommended at her sleep consultation.   No suicidal ideation. Continue open communication.   Discussed that it would be beneficial to increase her sertraline to 150 mg daily. New prescription was sent to the pharmacy for this. Continue to monitor for side effects. Call if she is worsening. Return to clinic in the next 3 months for a recheck.   - Ambulatory referral to Psychology      3. Lack of concentration  Jimena continues to struggle with concentration. She is working with [employer]. She has noted improvement with this, but she continues to find this to be difficult. She feels this is causing significant difficulty in school. She brought [place] forms from her parents and teachers. She also filled out a self-evaluation. The parental and school evaluations, do not meet criteria for ADHD. The self evaluation demonstrates significant ADHD symptoms.   She did have a neuropsych evaluation 2 years ago that demonstrated anxiety, depression and OCD. Recommend repeating this evaluation to reexamine for learning disability or learning difficulty as we don't have enough information to qualify for ADHD at this time. They are in agreement with this plan and will return after evaluation.   Family requests that we send the [place], self-evaluation and prior neuropsych report testing to the neuropsychologist for background as well.     Return to clinic after this evaluation is completed for further discussion. No indication to start stimulant medication at this time. However, will consider this if indicated by  "neuropsych testing.   - Ambulatory referral to Psychology        PLAN:  Patient Instructions   Take 150 mg of Zoloft once daily.     Neuropsych Evaluation- someone will call you in the near future to schedule an appointment.      Orders Placed This Encounter   Procedures     Ambulatory referral to Psychology     Referral Priority:   Routine     Referral Type:   Behavioral Health     Referral Reason:   Evaluation and Treatment     Number of Visits Requested:   1     Medications Discontinued During This Encounter   Medication Reason     sertraline (ZOLOFT) 100 MG tablet Reorder       No Follow-up on file.    CHIEF COMPLAINT:  Chief Complaint   Patient presents with     Anxiety     meds - maybe uping dose.      ADHD     Forms       HISTORY OF PRESENT ILLNESS:  Jimena is a 17 y.o. female presenting to the clinic today with her mother due to concerns of ADHD. Her focus has improved since previous. However, she still continues to have difficulties focusing, particularly with school work. Previously she was unable to focus while reading, but this has improved. She had a [place] screening completed by her parents and one of her teachers, Sangeeta Fonseca. She was scored the following by her mother: 2/9 inattentive, 1/9 hyperactive, and 4/7 performance. Her father scored her as 1/9 inattentive, 0/9 hyperactive, and 0/7 performance. Her teacher scored her as 1/9 inattentive, 1/9 hyperactive, and 3/8 performance. She also completed an \"Adult ADHD Self-Report Checklist\" and it scored very often for 4/6 questions in Part A, very often 3/12, and often 5/12 in part B. Her mother notes that Jimena had a class that she just barely passed. There had been concern that she was not going to pass due to her performance. Her mother gives this as an example of what has been going on and what they are dealing with. She was noted to be gifted/talented at the age of 7-8. Her mother is wondering whether or not anxiety can be " affecting her IQ as she has not been performing as well in school. Her mother notes that Jimena does have a significant lack of sleep at this time.     Of note, she had neuropsych testing completed through the [facility] around 1.5 years ago, in October 2016. At that time, it was noted that she had anxiety and depression components and it was recommend she follow up for psychiatric medication management and cognitive-behavioral therapy techniques. Her mother would like her to have a neuropsych evaluation completed at this time and is interested in having Jimena go to a different clinic than before.       Anxiety/Depression: Her mood and anxiety are improved at this time. Her symptoms are not as dramatic as they were two years ago. Her mood was worsening so her parents had Jimena increased her Zoloft dosage to 125 mg once daily as they interpreted being told at a previous visit that there was room to increase the medication if needed. She herself has not noticed much of a difference on the higher dose, but her parents have. Her mother notes that within two weeks of making the increase her mood appeared better.     PHQ-9 Score:  Little interest or pleasure in doing things: More than half the days  Feeling down, depressed, or hopeless: Several days  Trouble falling or staying asleep, or sleeping too much: Not at all  Feeling tired or having little energy: Nearly every day  Poor appetite or overeating: Not at all  Feeling bad about yourself - or that you are a failure or have let yourself or your family down: Several days  Trouble concentrating on things, such as reading the newspaper or watching television: Nearly every day  Moving or speaking so slowly that other people could have noticed. Or the opposite - being so fidgety or restless that you have been moving around a lot more than usual: More than half the days  Thoughts that you would be better off dead, or of hurting yourself in some way: Not at  "all  PHQ-9 Total Score: 12  If you checked off any problems, how difficult have these problems made it for you to do your work, take care of things at home, or get along with other people?: Somewhat difficult    SANTIAGO-7 Score:  Feeling nervous, anxious or on edge: 1  Not being able to stop or control worry: 1  Worrying too much about different things: 2  Trouble relaxin  Being so restless that is is hard to sit still: 1  Becoming easily annnoyed or irritable: 3  Feeling afraid as if something awful might happen: 2  SANTIAGO-7 Total: 11  How difficult did these problems make it for you to do your work, take care of things at home or get along with other people? : Somewhat difficult      REVIEW OF SYSTEMS:   Her back pain has improved. She did have a couple of twinges in her back a few days ago but she attributes it to bad posture and stress at school.   All other systems are negative.     PFSH:  Social: She has been on spring break this week and is going to an [employer] this weekend.  She is hoping to attend college next year and will be touring some campuses.     TOBACCO USE:   History   Smoking Status     Never Smoker   Smokeless Tobacco     Never Used       VITALS:   Vitals:    18 1012   BP: 100/58   Weight: 111 lb (50.3 kg)   Height: 5' 2.88\" (1.597 m)     Wt Readings from Last 3 Encounters:   18 111 lb (50.3 kg) (21 %, Z= -0.82)*   17 116 lb 8 oz (52.8 kg) (34 %, Z= -0.42)*   17 114 lb 3.2 oz (51.8 kg) (32 %, Z= -0.47)*     * Growth percentiles are based on CDC 2-20 Years data.     Body mass index is 19.74 kg/(m^2).    PHYSICAL EXAM:  Constitutional: She appears well-developed and well-nourished.    Cardiovascular: Regular, rate and rhythm. No murmurs  Lungs; Clear to auscultation bilaterally.  Neuro: Alert, oriented, normal gait. CN 2-12 grossly intact.   Psychiatric: She has a somewhat improved mood and affect. Her speech is normal and behavior is normal. Smiling occasionally, " improved from previous.       QUALITY MEASURES:   The following are part of a depression follow up plan for the patient:  management of mental health treatment and patient follow-up to return when and if necessary    ADDITIONAL HISTORY SUMMARIZED (2): Reviewed telephone call from 3/29/18; sertraline refill, 100 mg vs. 125 mg, worsening symptoms.  Reviewed [facility] note from 10/14/16; anxiety, depression.   DECISION TO OBTAIN EXTRA INFORMATION (1): None.   RADIOLOGY TESTS (1): None.   LABS (1): None.  MEDICINE TESTS (1): None.   INDEPENDENT REVIEW (2 each): None.     The visit lasted a total of 29 minutes face to face with the patient. Over 50% of the time was spent counseling and educating the patient about ADHD and anxiety/depression.     I, Laurie Rod, am scribing for and in the presence of Dr. Leana Alvarado.     Leana VICENTE MD , personally performed the services described in this documentation, as scribed by Laurie Rod in my presence, and it is both accurate and complete.     MEDICATIONS:   Current Outpatient Prescriptions   Medication Sig Dispense Refill     sertraline (ZOLOFT) 100 MG tablet Take 1.5 tablets (150 mg total) by mouth daily. TAKE 1 TABLET BY MOUTH DAILY 135 tablet 1     No current facility-administered medications for this visit.         Total data points: 2

## 2021-06-17 NOTE — TELEPHONE ENCOUNTER
Controlled Substance Refill Request  Medication Name:   Requested Prescriptions     Pending Prescriptions Disp Refills     dexmethylphenidate (FOCALIN XR) 20 MG 24 hr capsule 30 capsule 0     Sig: Take 1 capsule (20 mg total) by mouth daily.     Date Last Fill: 4/8/21  [facility]ed [facility]acy[facility]  Submit electronically to pharmacy  Controlled Substance Agreement on file:   Encounter-Level CSA Scan Date:    There are no encounter-level csa scan date.        Last office visit:  3/10/21

## 2021-06-17 NOTE — TELEPHONE ENCOUNTER
Telephone Encounter by Ame Rehman at 3/10/2021 11:01 AM     Author: Ame Rehman Service: -- Author Type: --    Filed: 3/10/2021 11:02 AM Encounter Date: 3/5/2021 Status: Signed    : Ame Rehman APPROVED:    Approval start date: 3/8/2021  Approval end date:  3/8/2022    Pharmacy has been notified of approval and will contact patient when medication is ready for pickup.

## 2021-06-18 NOTE — LETTER
Letter by Leana Alvarado MD at      Author: Leana Alvarado MD Service: -- Author Type: --    Filed:  Encounter Date: 3/13/2019 Status: (Other)         [facility]  03/13/19    Patient: Jimena Werner  YOB: 2000  Medical Record Number: [id number]  CSN: [id number]                                                                              Non-opioid Controlled Substance Agreement    I understand that my care provider has prescribed a controlled substance to help manage my condition(s). I am taking this medicine to help me function or work. I know this is strong medicine, and that it can cause serious side effects. Controlled substances can be sedating, addicting and may cause a dependency on the drug. They can affect my ability to drive or think, and cause depression. They need to be taken exactly as prescribed. Combining controlled substances with certain medicines or chemicals (such as cocaine, sedatives and tranquilizers, sleeping pills, meth) can be dangerous or even fatal. Also, if I stop controlled substances suddenly, I may have severe withdrawal symptoms.  If not helpful, I may be asked to stop them.    The risks, benefits, and side effects of these medicine(s) were explained to me. I agree that:    1. I will take part in other treatments as advised by my care team. This may be psychiatry or counseling, physical therapy, behavioral therapy, group treatment or a referral to a pain clinic. I will reduce or stop my medicine when my care team tells me to do so.  2. I will take my medicines as prescribed. I will not change the dose or schedule unless my care team tells me to. There will be no refills if I run out early.  I may be contactedwithout warning and asked to complete a urine drug test or pill count at any time.   3. I will keep all my appointments, and understand this is part of the monitoring of controlled substances. My care team may  require an office visit for EVERY controlled substance refill. If I miss appointments or dont follow instructions, my care team may stop my medicine.  4. I will not ask other providers to prescribe controlled substances, and I will not accept controlled substances from other people. If I need another prescribed controlled substance for a new reason, I will tell my care team within 1 business day.  5. I will use one pharmacy to fill all of my controlled substance prescriptions, and it is up to me to make sure that I do not run out of my medicines on weekends or holidays. If my care team is willing to refill my controlled substance prescription without a visit, I must request refills only during office hours, refills may take up to 3 days to process, and it may take up to 5 to 7 days for my medicine to be mailed and ready at my pharmacy. Prescriptions will not be mailed anywhere except my pharmacy.    6. I am responsible for my prescriptions. If the medicine/prescription is lost or stolen, it will not be replaced. I also agree not to share controlled substance medicines with anyone.          [facility]  03/13/19  Patient:  Jimena Werner  YOB: 2000  Medical Record Number: [id number]  CSN: [id number]    7. I agree to not use ANY illegal or recreational drugs. This includes marijuana, cocaine, bath salts or other drugs. I agree not to use alcohol unless my care team says I may. I agree to give urine samples whenever asked. If I dont give a urine sample, the care team may stop my medicine.    8. If I enroll in the [place] Medical Marijuana program, I will tell my care team. I will also sign an agreement to share my medical records with my care team.    9. I will bring in my list of medicines (or my medicine bottles) each time I come to the clinic.   10. I will tell my care team right away if I become pregnant or have a new medical problem treated outside of my regular  clinic.  11. I understand that this medicine can affect my thinking and judgment. It may be unsafe for me to drive, use machinery and do dangerous tasks. I will not do any of these things until I know how the medicine affects me. If my dose changes, I will wait to see how it affects me. I will contact my care team if I have concerns about medicine side effects.    I understand that if I do not follow any of the conditions above, my prescriptions or treatment may be stopped.      I agree that my provider, clinic care team, and pharmacy may work with any city, state or federal law enforcement agency that investigates the misuse, sale, or other diversion of my controlled medicine. I will allow my provider to discuss my care with or share a copy of this agreement with any other treating provider, pharmacy or emergency room where I receive care. I agree to give up (waive) any right of privacy or confidentiality with respect to these consents.   I have read this agreement and have asked questions about anything I did not understand.    ___________________________________________________________________________  Patient signature - Date/Time  -Jimena Werner                                      ___________________________________________________________________________  Witness signature                                                                    ___________________________________________________________________________  Provider signature- Leana Alvarado MD

## 2021-06-18 NOTE — PROGRESS NOTES
Assessment/Plan:  1. Attention deficit hyperactivity disorder (ADHD), other type, dx 4/12/18  2. Controlled substance agreement signed 6/18/18  Nadia has ADHD, other type by neuropsych testing. Discussed behavioral interventions need to help her be successful. These were addressed in the testing report as well. In addition, she would benefit from educational accommodations. A letter was written for this and will be mailed to her home.   Discussed the option of medication. The family is interested in a trial of medication to see if this is helpful as well. Discussed the options, side effects. Reviewed family history. Will start a trial of focalin 10 mg daily. Will monitor for changes in her sleep patterns and decreased appetite. They will follow up within 1 month or call if needed sooner. They will follow up at least every 3-6 months when stable.     3. Major depressive disorder with current active episode  She has major depressive disorder. She feels that she is doing well with her current dose of sertraline. Discussed the need to have access to counseling services in college. They have identified these services and she feels that they have adequate resources available for her. She denies any SI/HI. Will continue sertraline and monitor closely.     4. Social anxiety disorder  She has social anxiety disorder. She feels this is under control with sertraline as well. She will be staying in a single dorm room, but intends to stay active and meet other students at school. Again discussed the need to work with a counselor as she transitions to college.     Letter for school with accommodations was written and will be mailed to the family.     There are no Patient Instructions on file for this visit.    SUBJECTIVE:  Jimena Werner is a 17 y.o. female present to clinic with mother to follow up on ADHD.     Pt is currently on:    Current Outpatient Prescriptions:      sertraline (ZOLOFT) 100 MG tablet, Take 1.5 tablets  (150 mg total) by mouth daily. TAKE 1 TABLET BY MOUTH DAILY, Disp: 135 tablet, Rfl: 1     dexmethylphenidate (FOCALIN XR) 10 MG 24 hr capsule, Take 1 capsule (10 mg total) by mouth daily., Disp: 30 capsule, Rfl: 0    She was last seen on 4/4/18.     Her diagnosis was made on 4/12/18.    Current school and grade level: She recently graduated high school and is starting her first year at [employer] this fall. Her last 2 weeks of high school were very difficult. She was very stressed out about finishing all of her assignments before going to a social event, which caused her to melt down. The following week she had a hard time finishing an assignment needed for graduation due to lack of focus. Her mother says she used to get all As and Bs but then barely graduated.   Special classes if any: None.   Peer interactions: She does have good peer interactions. She is excited to live in a dorm and join clubs at college.   Mood: She feels like her mood has improved.   Swallow pills: Yes.   Drug use: None. The [place] Prescribing Monitoring program website was checked for this patient and did not show any concerning refills.    Other concerns or comments: N/A    Social history:   Social History     Social History Narrative    Lives at home with mother and father.             Lives at home with mom and dad.   Family stressors: none reported.     Family history:   Family History   Problem Relation Age of Onset     Hypothyroidism Maternal Grandmother        ADHD: None reported.   Learning problems: None reported  Anxiety, Bipolar, depression: None reported  Tic's or tourette's: None  Hypertrophic cardiomyopathy, long Qtc and sudden death: None.     Past Medical History:  No past medical history on file.  Past Surgical History:   Procedure Laterality Date     [place] REMOVAL OF TONSILS,<12 Y/O      Description: Tonsillectomy;  Recorded: 03/03/2010;       Allergies:   Allergies   Allergen Reactions     Cauliflower Itching     More of a  "sensitivity- has Mouth itching       ROS:  All other systems negative.      Exam:  Vitals:    06/18/18 1050   BP: 96/64   Patient Site: Left Arm   Patient Position: Sitting   Cuff Size: Adult Regular   Pulse: 80   Weight: 109 lb 11.2 oz (49.8 kg)   Height: 5' 2.25\" (1.581 m)       MENTAL STATUS:  Gen: Alert, oriented. Well groomed, interacts appropriately with examiner.    Speech:No abnormal speech or flight of ideas.   Mood: euthymic.    Thought content: No abnormal thought content.  Judgment: intact  Fund of knowledge: appropriate for age.  Neck: thyroid non enlarged  Cardiovascular Exam: RRR without murmurs, clicks or gallops.  Lung Exam: Clear and equal breath sounds.  Musculoskeletal Exam: Gross survey unremarkable. Gait smooth and coordinated.    ADDITIONAL HISTORY SUMMARIZED (2): Reviewed neuro psych testing from [facility] 4/12/18.   DECISION TO OBTAIN EXTRA INFORMATION (1): None.   RADIOLOGY TESTS (1): None.  LABS (1): None.  MEDICINE TESTS (1): None.  INDEPENDENT REVIEW (2 each): None.     The visit lasted a total of 30 minutes face to face with the patient. Over 50% of the time was spent counseling and educating the patient about ADHD, anxiety, depression, and medications.    I, Alexandra Severson, am scribing for and in the presence of, Dr. Leana Alvarado.    I, Dr. Leana Alvarado , personally performed the services described in this documentation, as scribed by Alexandra Severson in my presence, and it is both accurate and complete.    Total data points: 2    "

## 2021-06-19 NOTE — LETTER
Letter by Leana Alvarado MD at      Author: Leana Alvarado MD Service: -- Author Type: --    Filed:  Encounter Date: 6/5/2019 Status: (Other)         June 5, 2019     Patient: Jimena Werner   YOB: 2000   Date of Visit: 6/5/2019       To Whom It May Concern:    It is my medical opinion that Jimena Werner may return to work on 6/7/19.    If you have any questions or concerns, please don't hesitate to call.    Sincerely,        Electronically signed by Leana Alvarado MD

## 2021-06-19 NOTE — LETTER
Letter by Erin Levin at      Author: Erin Levin Service: -- Author Type: --    Filed:  Encounter Date: 5/9/2019 Status: (Other)         Jimena Werner  [address] [place] [address]           05/09/19       Dear Jimena:      At [facility], we care about your health and well-being.  Your primary care provider is committed to ensuring you receive high quality care and has chosen a network of specialists to assist in providing that care.  Recently Dr. Alvarado referred you to a psychologist for counseling.    It is important to your overall health to follow through with the referral from your care provider.  Please call me at [phone] at your earliest convenience for assistance in scheduling an appointment with the recommended specialist.  If you have already scheduled an appointment, please disregard this notice.  Thank you for choosing the [facility] for your healthcare needs.        Sincerely,        Electronically signed by Erin Levin      Referral Coordinator   [facility]

## 2021-06-19 NOTE — PROGRESS NOTES
Assessment/Plan:  1. Attention deficit hyperactivity disorder (ADHD), other type, dx 4/12/18  Jimena is an 17 year old with ADHD. She was recently started on Focalin. She does feel this has been helpful to improve her focus and attention. However, she still feels that she does get distracted though. No change in appetite. It is not making it difficult for her to sleep. Will continue the current dose of Focalin for 1 week as we adjust her sertraline below. Then will increase her Focalin to 15 mg daily.     2. Moderate episode of recurrent major depressive disorder (H)  3. Social anxiety disorder  Jimena has moderate depression symptoms as well as worsening anxiety symptoms. She has not been seeing her friends as often. She is having some panic attack symptoms while working too. As she has worsening symptoms and will be starting college in August, recommend increasing her sertraline to 200 mg daily. Discussed that if this doesn't improve her symptoms, would recommend a cross taper of medication to another SSRI like lexapro or prozac. They are in agreement.     Call or follow up prior to leaving for college on August 14. Return for a medication check over the winter break or sooner as needed. Recommend [employer] for communication while in [place].    There are no Patient Instructions on file for this visit.    SUBJECTIVE:  Jimena Werner is a 17 y.o. female here with mother for anxiety, depression and ADHD follow up. She has noted improvement in her symptoms overall in the past year. However, her anxiety symptoms have been worsening in the past 1-2 months.     Interest in activities: She notes less interest in activities. She has not been getting together or seeing her friends as often.   Problems concentrating: She has continued difficulty concentrating. However, this has improved since starting focalin daily last month.   Irritability: She does have some irritability, but this isn't as  significant.  Mood/Energy/Interest: Her overall mood and interest has decreased. She is sleeping more often.   Appetite: Her appetite has been good. She doesn't feel this has changed or worsened.   Difficulties with sleep: She has been sleeping well, but somewhat more than before.   Guilt: No significant guilt symptoms.   Suicide Ideation/Cutting: None  Panic attacks, Anjelica, post-traumatic stress: She has been having panic attacks while at work. She didn't have those previously, but they have been worsening recently.   Current school and grade level: She will be starting her Freshman year in college.  Special classes if any: None  Peer interactions: She has good friends, but has not been able to see them as often this summer.   Drug abuse: None  Wound up, tense: She does feel somewhat tense, but overall isn't as concerned about this.   Fatigues easily: She continues to fatigue, but she doesn't feel this is significantly more than her usual. Mother does think she has been more tired though.      Other concerns or comments: She would like to consider an increase in her ADHD medication to help control her symptoms and attention further.     Social history:   Social History     Social History Narrative    Lives at home with mother and father.             Lives at home with mother and father.   Family stressors none    Family history:   ADHD:None  Learning problems: None  Anxiety, Bipolar, depression: None  Drug use: None    Data:  PHQ-9  PHQ-9 Total Score: 12 (2018 11:00 AM)            SANTIAGO-7 Screening Results:  Feeling nervous, anxious or on edge: 2  Not being able to stop or control worry: 2  Worrying too much about different things: 2  Trouble relaxin  Being so restless that is is hard to sit still: 1  Becoming easily annnoyed or irritable: 1  Feeling afraid as if something awful might happen: 2  SANTIAGO-7 Total: 12  How difficult did these problems make it for you to do your work, take care of things at home or  get along with other people? : Very difficult      Past Medical History:  No past medical history on file.  Past Surgical History:   Procedure Laterality Date     [place] REMOVAL OF TONSILS,<10 Y/O      Description: Tonsillectomy;  Recorded: 03/03/2010;       Current Meds:     Current Outpatient Prescriptions:      sertraline (ZOLOFT) 100 MG tablet, Take 2 tablets (200 mg total) by mouth daily. TAKE 1 TABLET BY MOUTH DAILY, Disp: 180 tablet, Rfl: 1     dexmethylphenidate (FOCALIN XR) 10 MG 24 hr capsule, Take 1 capsule (10 mg total) by mouth daily., Disp: 7 capsule, Rfl: 0     dexmethylphenidate (FOCALIN XR) 15 MG 24 hr capsule, Take 1 capsule (15 mg total) by mouth daily., Disp: 30 capsule, Rfl: 0  Allergies:   Allergies   Allergen Reactions     Cauliflower Itching     More of a sensitivity- has Mouth itching       ROS:  General: Health is good  Endocrine: Growing in height and weight well.  Cardiac: No chest pain, palpitations, or syncope, No chest pain with exercise   GI: Good appetite, no stomachaches, no nausea, no diarrhea, no emesis, no constipation  : no enuresis  Neuro: No headaches, sleep disturbance, tics, changes in mood, no changes in activity level.     Exam:  Vitals:    07/31/18 1352   BP: 90/60   Patient Site: Left Arm   Patient Position: Sitting   Cuff Size: Adult Regular   Pulse: 91   Resp: 18   Temp: 98.5  F (36.9  C)   TempSrc: Oral   Weight: 108 lb 12.8 oz (49.4 kg)       MENTAL STATUS:  Gen: Alert, oriented. Well groomed, interacts appropriately with examiner.    Speech:No abnormal speech or flight of ideas.   Mood: euthymic.    Thought content: No abnormal thought content.  Judgment: intact  Fund of knowledge: appropriate for age.  Neck: thyroid non enlarged  Cardiovascular Exam: RRR without murmurs, clicks or gallops.  Lung Exam: Clear and equal breath sounds.  Musculoskeletal Exam: Gross survey unremarkable. Gait smooth and coordinated.       Total of 25 minutes spent with patient, > 50% spent  in counseling and/or coordination of care.     Leana Alvarado ....................  7/31/2018   9:24 PM

## 2021-06-23 NOTE — TELEPHONE ENCOUNTER
Controlled Substance Refill Request  Medication:   Requested Prescriptions     Pending Prescriptions Disp Refills     dexmethylphenidate (FOCALIN XR) 15 MG 24 hr capsule 30 capsule 0     Sig: Take 1 capsule (15 mg total) by mouth daily.     Date Last Fill: 12/17/18  Pharmacy: [facility]   Submit electronically to pharmacy  Controlled Substance Agreement on File: 6/18/18  Encounter-Level CSA Scan Date:    There are no encounter-level csa scan date.       Last office visit:7/31/2018 Leana Alvarado MD  PHY 12/17/18 - Leana Alvarado MD  .

## 2021-06-24 NOTE — PROGRESS NOTES
Assessment/Plan:  1. Attention deficit hyperactivity disorder (ADHD), other type  She has ADHD that is partially controlled, but she continues to have difficulty with attention. Recommend increasing her focalin to 20 mg daily. Monitor for decreased appetite or difficulty with sleep. Call if any concerns. Follow up in 3 months in clinic.   - Ambulatory referral to Psychology  - dexmethylphenidate (FOCALIN XR) 20 MG 24 hr capsule; Take 1 capsule (20 mg total) by mouth daily.  Dispense: 30 capsule; Refill: 0    2. Severe episode of recurrent major depressive disorder, without psychotic features (H)  She has severe recurrent depression. She has had improvement in this with sertraline, except with her periods as below. Will continue sertraline daily. Will need to consider a change to another medication if she doesn't have improvement of the PMDD symptoms with OCPs. She does contract for safety today. She identifies her mother as a safety contact as well. No current SI.  - Ambulatory referral to Psychology    3. Social anxiety disorder  She has social anxiety disorder. She is working on techniques to help with this with her therapist and feels this is improving.   - Ambulatory referral to Psychology    4. PMDD (premenstrual dysphoric disorder)  Nadia has signs of PMDD. As she is taking sertraline daily already and this has been fairly effective for her. Recommend a trial of OCPs to help augment this. Discussed risks and benefits and different types of birth control. No family history of blood clots. UPT was negative. Recommend a trial of birth control medication. She will either return in 1 month or have a BP check at school in that time and send a message with the update. Monitor for side effects and send a message if concerns.  - Pregnancy (Beta-hCG, Qual), Urine          SUBJECTIVE:  Jimena Werner is a 18 y.o. female present to clinic with mother to follow up on ADHD, depression, anxiety and PMDD.     Pt is  currently on:    Current Outpatient Medications:      dexmethylphenidate (FOCALIN XR) 20 MG 24 hr capsule, Take 1 capsule (20 mg total) by mouth daily., Disp: 30 capsule, Rfl: 0     sertraline (ZOLOFT) 100 MG tablet, Take 2 tablets (200 mg total) by mouth daily., Disp: 180 tablet, Rfl: 1     norgestimate-ethinyl estradiol (SPRINTEC, 28,) 0.25-35 mg-mcg per tablet, Take 1 tablet by mouth daily., Disp: 84 tablet, Rfl: 0    She was last seen on 12/17/18        Possible Side Effects: None  Use on weekends and holidays: Yes  When takes medicine: 8 am  Time that is wears off: 6 pm    She does feel that the medication has been helpful. However, she does feel she still has difficulty with focus. She has worked on finding different study locations and has had some success with this, but is still struggling with attention and focus.    Current school and grade level: Freshman at [employer]  Special classes if any: None  Peer interactions: Has been making friends at school.  Mood: Mood has been improved overall. She feels that she is starting to get into a routine. She feels she is staying on top of her work and her workload is more manageable now.  Sleep:She is sleeping more and feels more rested.  Swallow pills: Yes  Drug use: . The [place] Prescribing Monitoring program website was checked for this patient and did not show any concerning refills.    Other concerns or comments: Nadia feels her anxiety is under control. She is working with a therapist at school every other week. This has been helpful. She feels her mood is better overall. However, she notes that she has been having significant difficult for about 3 days around the onset of her period. She has significant worsening of her depression and has suicidal ideation, but no plan. She identifies her mother as her safety contact and contracts for safety.      Social history:   Social History     Social History Narrative    Lives at home with mother and father.              Lives at home with mother and father when home from school. Lives on campus at [employer].  Family stressors None    Family history:   Family History   Problem Relation Age of Onset     Hypothyroidism Maternal Grandmother          Past Medical History:  History reviewed. No pertinent past medical history.  Past Surgical History:   Procedure Laterality Date     CA REMOVAL OF TONSILS,<10 Y/O      Description: Tonsillectomy;  Recorded: 03/03/2010;       Allergies:   Allergies   Allergen Reactions     Cauliflower Itching     More of a sensitivity- has Mouth itching       ROS:  General: Health is good  Endocrine: Growing in height and weight well.  Cardiac: No chest pain, palpitations, or syncope, No chest pain with exercise   GI: Good appetite, no stomachaches, no nausea, no diarrhea, no emesis, no constipation  : no enuresis  Neuro: No headaches, sleep disturbance, tics, changes in mood, no changes in activity level.     Exam:  Vitals:    03/13/19 0941   BP: 96/52   Pulse: 76   Weight: 104 lb 11.2 oz (47.5 kg)       MENTAL STATUS:  Gen: Alert, oriented. Well groomed, interacts appropriately with examiner.    Speech:No abnormal speech or flight of ideas.   Mood: euthymic.    Thought content: No abnormal thought content.  Judgment: intact  Fund of knowledge: appropriate for age.  Neck: thyroid non enlarged  Cardiovascular Exam: RRR without murmurs, clicks or gallops.  Lung Exam: Clear and equal breath sounds.  Musculoskeletal Exam: Gross survey unremarkable. Gait smooth and coordinated.         Total of >40 minutes spent with patient, > 50% spent in counseling and/or coordination of care.     Leana Alvarado ....................  3/13/2019   9:50 PM

## 2021-06-24 NOTE — PATIENT INSTRUCTIONS - HE
Crisis Intervention Plan - Adult  Step 1:   Warning Signs (thoughts, images, mood, situation, behavior) that a crisis may be developin. _______________________________________________________________  2. _______________________________________________________________  3. _______________________________________________________________   Step 2:   Internal coping strategies - Things I can do to take my mind off my problems without contacting another person (relaxation technique, physical activity)   1. ______________________________________________________________  2. ______________________________________________________________  3. ______________________________________________________________   Step 3: People and social settings that provide distraction:    1. Name ______________________________ Phone _____________________  2. Name ______________________________ Phone _____________________  3. Place _________________________ 4. Place _________________________   Step 4: People whom I can ask for help:   1. Name ______________________________ Phone _____________________  2. Name ______________________________ Phone _____________________  3. Name ______________________________ Phone _____________________   Step 5: Professional or agencies I can contact during a crisis:   1. PCP _______________________________ Phone _____________________  2. Therapist ___________________________ Phone _____________________  3. Work ______________________________ Phone _____________________  4. Local Urgent Care Services  _____ ______ Phone _____________________  5. Suicide Prevention Lifeline Phone: 9-088-628-TALK (0583)   Text line: text  START  to 001276   Step 6:  Making the environment safe:   1. _______________________________________________________________  2. _______________________________________________________________           The one thing that is most important to me and worth living for is:            _______________________________________________________________________________    More Resources: blogs, text/call services, articles    National Suicide Prevention Line: 1-320.929.3381  o Press  2  for [place]   o https://suicidepreventionlifeline.org/ (specific help for veterans, LGBTQ+, attempt survivors, etc.)    Hotline: 1-700.606.6033    Young Adults: https://www.[facility].org/populations/young-adults    Adults 26-55: https://www.[facility].org/populations/adults-26-55     Older Adults: https://www.[facility].org/populations/older-adults    [facility] [place]  --  24/7 crisis hotline: 237.119.2411 or 165-667-8157    Adult Mental Health Crisis Response - hotlines by county: https://[place].gov/dhs/people-we-serve/adults/health-care/mental-health/resources/crisis-contacts.jsp   o [place]: 1-144.754.4323  o [place]: 1-492.606.8633  o [place]: 1-801.508.3182  o [place]: 1-136.137.1277  o [place]: 514.624.6515  o [place]: 1-642.436.8008  [facility]  Crisis Services:  [facility]   Adult Mental Health Services   24 hours / 7 days  [phone]   Crisis Program:  [facility]   24 hours / 7 days  ([phone] TDD [place]  Crisis Intervention Center  [facility]  24 hours / 7 days  Suicide Hotline  (873) 518-8818  Crisis Referral Line  (953) 953-4490     [place]   Suicide Crisis Hotline  [facility]   24 hours / 7 days  (292) 510-7738    [place] / [facility]  For [facility] for Residents Only  24-hour Crisis Counseling  Crisis Connection   24 hours / 7 days  [phone] [phone]) [phone] TDD  [facility] Crisis Line  24 hours / 7 days  ([phone]) [phone] TDD    [place]  Crisis Line  [facility] Crisis Response  24 hours / 7 days  ([phone]) [phone] TDD [place]  Serving Residents of [place] or [facility]  Crisis Intervention  [facility] Mental Health Crisis Program  24 hours / 7  days  ([phone]          Other resources for crisis planning:               [employer]AE [place]: www.namihelps.org              [facility] for Mental Health law: www.[facility].org              [place] Department of Human Services: www.dhs.[facility].mn.              National [place] of Mental Illness: www.nimh.nih.gov/              Substance Abuse Mental Health Services Administration: www.samhsa.gov/

## 2021-06-27 ENCOUNTER — COMMUNICATION - HEALTHEAST (OUTPATIENT)
Dept: PEDIATRICS | Facility: CLINIC | Age: 21
End: 2021-06-27

## 2021-06-27 DIAGNOSIS — F90.8 ATTENTION DEFICIT HYPERACTIVITY DISORDER (ADHD), OTHER TYPE: ICD-10-CM

## 2021-06-27 NOTE — PROGRESS NOTES
Progress Notes by Leana Alvarado MD at 12/17/2018  8:40 AM     Author: Leana Alvarado MD Service: -- Author Type: Physician    Filed: 12/18/2018  9:43 AM Encounter Date: 12/17/2018 Status: Signed    : Leana Alvarado MD (Physician)       FEMALE PREVENTATIVE EXAM    Assessment and Plan:       1. Encounter for routine adult health examination without abnormal findings  - Lipid Cascade FASTING  - [facility] LAV    2. Attention deficit hyperactivity disorder (ADHD), other type  She has ADHD that is well controlled with Focalin 15 mg daily. Continue this dose. Refill sent for January. Try to take this with some food or a carnation instant breakfast in the morning.  - dexmethylphenidate (FOCALIN XR) 15 MG 24 hr capsule; Take 1 capsule (15 mg total) by mouth daily.  Dispense: 30 capsule; Refill: 0    3. Social anxiety disorder  4. Current moderate episode of major depressive disorder, unspecified whether recurrent (H)  Jimena does continue to struggle with anxiety and depression. She does feel the anxiety is worsening with elevated expectations in school. She has taken proactive measures to try to help with this next semester. Will work on these and she will send a message if she feels she is not improving with this.     5. Weight loss  Discussing trying to have carnation instant breakfast or a small meal on the go in the morning prior to taking her focalin in the morning.         Next follow up:  Return in about 3 months (around 3/17/2019) for medication check.    Immunization Review  Adult Imm Review: Due today, orders placed    I discussed the following with the patient:   Adult Healthy Living: Importance of regular exercise  Healthy nutrition  Getting adequate sleep  Stress management  Use of seat belts  STI prevention        Subjective:   Chief Complaint: Jimena Werner is an 18 y.o. female here for a preventative health visit.     HPI:  Jimena is a 18 year old female  who had a good first semester at college. She is hear for a preventive health visit and medication check.     She has concerns about weight loss. She has been trying to eat regular meals twice daily. She does struggle with eating breakfast as her waking time varies. She feels somewhat nauseous if she takes her Focalin without eating though.     She also feels that she is doing well with concentration overall. She does feel the focalin has been helpful. She is not having difficulty falling asleep.     She does continues to have difficulty with mood and anxiety, but primarily anxiety. She reports that she is looking into getting an appointment with a counselor in January. She is also cutting back on her course load next semester as she does feel this contributed to her rising stress level. She would like to try these things prior to changing her medication. No suicidal or homicidal ideation.     Healthy Habits  Are you taking a daily aspirin? No  Do you typically exercising at least 40 min, 3-4 times per week?  Yes  Do you usually eat at least 4 servings of fruit and vegetables a day, include whole grains and fiber and avoid regularly eating high fat foods? Yes  Have you had an eye exam in the past two years? Yes  Do you see a dentist twice per year? Yes  Do you have any concerns regarding sleep? YES    Safety Screen  If you own firearms, are they secured in a locked gun cabinet or with trigger locks? Yes  Do you feel you are safe where you are living?: Yes (12/17/2018  8:40 AM)  Do you feel you are safe in your relationship(s)?: Yes (12/17/2018  8:40 AM)      Review of Systems:  Please see above.  The rest of the review of systems are negative for all systems.       Cancer Screening     Patient has no health maintenance due at this time          Patient Care Team:  Leana Alvarado MD as PCP - General (Pediatrics)        History     Reviewed By Date/Time Sections Reviewed    Leana Alvarado MD  "12/17/2018  9:02 AM Eun García MA 12/17/2018  8:40 AM Tobacco            Objective:   Vital Signs:   Visit Vitals  /72   Pulse 76   Ht 5' 2.5\" (1.588 m)   Wt 103 lb 6.4 oz (46.9 kg)   LMP 12/10/2018 (Exact Date)   BMI 18.61 kg/m           PHYSICAL EXAM  Constitutional: She appears well-developed and well-nourished.   HEENT: Head: Normocephalic.    Right Ear: Tympanic membrane, external ear and canal normal.    Left Ear: Tympanic membrane, external ear and canal normal.    Nose: Nose normal.    Mouth/Throat: Mucous membranes are moist. Oropharynx is clear.    Eyes: Conjunctivae and lids are normal. Pupils are equal, round, and reactive to light. Optic discs are sharp.   Neck: Neck supple. No tenderness is present.   Cardiovascular: Normal rate and regular rhythm. No murmur heard.  Pulses: Femoral pulses are 2+ bilaterally.   Pulmonary/Chest: Effort normal and breath sounds normal. There is normal air entry. Breast development is normal.  Filemon stage 5.   Abdominal: Soft. There is no hepatosplenomegaly. No inguinal hernia.   Musculoskeletal: Normal range of motion. Normal strength and tone. No abnormalities. Spine is straight. Normal duck walk.  Normal heel to toe walk.   : Normal external female genitalia.  Filemon stage 5.   Neurological: She is alert. She has normal reflexes. Gait normal.   Psychiatric: She has a normal mood and affect. Her speech is normal and behavior is normal.  Skin: Clear.Closed comedomal acne on the forehead             Medication List           Accurate as of 12/17/18 11:59 PM. If you have any questions, ask your nurse or doctor.               CONTINUE taking these medications    dexmethylphenidate 15 MG 24 hr capsule  Also known as:  FOCALIN XR  INSTRUCTIONS:  Take 1 capsule (15 mg total) by mouth daily.        sertraline 100 MG tablet  Also known as:  ZOLOFT  INSTRUCTIONS:  Take 2 tablets (200 mg total) by mouth daily TAKE 1 TABLET BY MOUTH DAILY.            "   Where to Get Your Medications      These medications were sent to [id number] - [place], [place] - [address] [place] [id number]    Phone:  [phone]     dexmethylphenidate 15 MG 24 hr capsule    sertraline 100 MG tablet         Additional Screenings Completed Today:

## 2021-06-28 RX ORDER — DEXMETHYLPHENIDATE HYDROCHLORIDE 20 MG/1
20 CAPSULE, EXTENDED RELEASE ORAL DAILY
Qty: 30 CAPSULE | Refills: 0 | Status: SHIPPED | OUTPATIENT
Start: 2021-06-28 | End: 2021-09-15

## 2021-07-03 NOTE — ADDENDUM NOTE
Addendum Note by Ralf Alvarado MD at 11/12/2018  8:54 AM     Author: Ralf Alvarado MD Service: -- Author Type: Physician    Filed: 11/12/2018  8:54 AM Encounter Date: 11/10/2018 Status: Signed    : Ralf Alvarado MD (Physician)    Addended by: RALF ALVARADO on: 11/12/2018 08:54 AM        Modules accepted: Orders

## 2021-07-03 NOTE — ADDENDUM NOTE
Addendum Note by Ralf Alvarado MD at 6/22/2020 11:04 AM     Author: Ralf Alvarado MD Service: -- Author Type: Physician    Filed: 6/22/2020 11:04 AM Encounter Date: 6/19/2020 Status: Signed    : Ralf Alvarado MD (Physician)    Addended by: RALF ALVARADO on: 6/22/2020 11:04 AM        Modules accepted: Orders

## 2021-07-03 NOTE — ADDENDUM NOTE
Addendum Note by Geeta Ojeda CMA at 9/7/2018 10:02 AM     Author: Geeta Ojeda CMA Service: -- Author Type: Medical Assistant    Filed: 9/7/2018 10:02 AM Encounter Date: 9/6/2018 Status: Signed    : Geeta Ojeda CMA (Medical Assistant)    Addended by: GEETA OJEDA on: 9/7/2018 10:02 AM        Modules accepted: Orders

## 2021-07-07 NOTE — TELEPHONE ENCOUNTER
Refill request for medication:   Requested Prescriptions     Pending Prescriptions Disp Refills     dexmethylphenidate (FOCALIN XR) 20 MG 24 hr capsule 30 capsule 0     Sig: Take 1 capsule (20 mg total) by mouth daily.       Last visit addressing this medication: 3/10/21  Follow up plan 6  months  Last refill on 5/12/21, quantity #30   CSA completed 3/13/2019  Date PDMP was last reviewed 5/12/21    Appointment: Not due   Appointment recommended at least every 3 months for opioid prescriptions. Appointment recommended at least every 6 months for ADHD medications.    lEvi Petty LPN

## 2021-07-07 NOTE — TELEPHONE ENCOUNTER
Controlled Substance Refill Request  Medication Name:   Requested Prescriptions     Pending Prescriptions Disp Refills     dexmethylphenidate (FOCALIN XR) 20 MG 24 hr capsule 30 capsule 0     Sig: Take 1 capsule (20 mg total) by mouth daily.     Date Last Fill: 5/12/21  [facility]  Submit electronically to pharmacy  Controlled Substance Agreement on file:   Encounter-Level CSA Scan Date:    There are no encounter-level csa scan date.        Last office visit:  3/10/21  Lorin Fulton RN, MA  [facility]    Triage Nurse Advisor

## 2021-08-05 DIAGNOSIS — F90.8 ATTENTION DEFICIT HYPERACTIVITY DISORDER (ADHD), OTHER TYPE: ICD-10-CM

## 2021-08-05 NOTE — TELEPHONE ENCOUNTER
8-5-21  Reason for Call: medication refill:    Do you use a [facility]?  [facility] on [address]    Name of the medication requested: dexmethylphenidate (FOCALIN XR) 20 MG 24 hr capsule    Other request: none    Can we leave a detailed message on this number? YES    Phone number patient can be reached at:[phone]  Best Time: anytime    Call taken on 8/5/2021 at 12:07 PM by Najma Benitez

## 2021-08-10 RX ORDER — DEXMETHYLPHENIDATE HYDROCHLORIDE 20 MG/1
20 CAPSULE, EXTENDED RELEASE ORAL DAILY
Qty: 30 CAPSULE | Refills: 0 | Status: SHIPPED | OUTPATIENT
Start: 2021-08-10 | End: 2021-11-24

## 2021-09-14 ENCOUNTER — TELEPHONE (OUTPATIENT)
Dept: PEDIATRICS | Facility: CLINIC | Age: 21
End: 2021-09-14

## 2021-09-14 DIAGNOSIS — F90.8 ATTENTION DEFICIT HYPERACTIVITY DISORDER (ADHD), OTHER TYPE: ICD-10-CM

## 2021-09-14 NOTE — TELEPHONE ENCOUNTER
Reason for Call:  Medication or medication refill:    Do you use a [facility]?  Name of the pharmacy and phone number for the current request:  [facility]    Name of the medication requested: dexmethylphenidate (FOCALIN XR) 20 MG 24 hr capsule    Other request: Patient is also wondering if she is still about to schedule physical and med check with Dr Alvarado since she is 20.      Can we leave a detailed message on this number? YES    Phone number patient can be reached at: Cell number on file:    Telephone Information:   Mobile [phone]       Best Time: anytime    Call taken on 9/14/2021 at 12:26 PM by Karolyn Frankel

## 2021-09-15 RX ORDER — DEXMETHYLPHENIDATE HYDROCHLORIDE 20 MG/1
20 CAPSULE, EXTENDED RELEASE ORAL DAILY
Qty: 30 CAPSULE | Refills: 0 | Status: SHIPPED | OUTPATIENT
Start: 2021-09-15 | End: 2021-11-24

## 2021-09-15 NOTE — TELEPHONE ENCOUNTER
I sent a refill to the pharmacy. I would be happy to see Jimena for her physical. I usually see patients until they turn 21 years old. We can talk about a transition plan to an adult provider when she comes in for her physical.

## 2021-09-16 NOTE — TELEPHONE ENCOUNTER
LM for Jimena that Dr Alvarado sent a refill in and she can see Jimena until she is 21.  Please help schedule for physical/ med check and then they can talk about transition into adult care. AARON BROWN on 9/16/2021 at 10:46 AM

## 2021-09-25 ENCOUNTER — HEALTH MAINTENANCE LETTER (OUTPATIENT)
Age: 21
End: 2021-09-25

## 2021-10-19 PROBLEM — F32.9 MAJOR DEPRESSION: Status: ACTIVE | Noted: 2020-01-11

## 2021-11-10 DIAGNOSIS — F32.1 CURRENT MODERATE EPISODE OF MAJOR DEPRESSIVE DISORDER, UNSPECIFIED WHETHER RECURRENT (H): ICD-10-CM

## 2021-11-10 DIAGNOSIS — F40.10 SOCIAL ANXIETY DISORDER: ICD-10-CM

## 2021-11-11 RX ORDER — VENLAFAXINE HYDROCHLORIDE 75 MG/1
CAPSULE, EXTENDED RELEASE ORAL
Qty: 90 CAPSULE | Refills: 1 | Status: SHIPPED | OUTPATIENT
Start: 2021-11-11 | End: 2022-12-15

## 2021-11-11 RX ORDER — VENLAFAXINE HYDROCHLORIDE 37.5 MG/1
CAPSULE, EXTENDED RELEASE ORAL
Qty: 90 CAPSULE | Refills: 1 | Status: SHIPPED | OUTPATIENT
Start: 2021-11-11 | End: 2022-09-09

## 2021-11-11 NOTE — TELEPHONE ENCOUNTER
Called pt and she states she does not have a new provider. She would like to see Jenny for one more med check and then will est care with another provider for further care.   Tried scheduling on Jenny's schedule but was not able to due to patient's age. Pt would like to do a VV on Wed 11/24 at 4:00. Gave info to scheduling to see if they can fix it.     FYI to PCP

## 2021-11-11 NOTE — TELEPHONE ENCOUNTER
Can you see if Jimena has established care with another provider for these medications? If not, could you help her schedule an appointment with me for a medication check. I can refill if we schedule a medication check and she has not established care yet.

## 2021-11-11 NOTE — TELEPHONE ENCOUNTER
Routing refill request to provider for review/approval because:  Labs not current:  Creatinine  Blood pressure not current  No recent (6 mo) visit    Venlafaxine 37.5 mg Last Written Prescription Date:  5/15/2021  Last Fill Quantity: 90,  # refills: 1   Last office visit provider:  3/10/2021 Dr. Alvarado     Venlafaxine 75 mg Last Written Prescription Date:  3/10/2021  Last Fill Quantity: 90,  # refills: 1   Last office visit provider:  3/10/2021 Dr. Alvarado       venlafaxine (EFFEXOR-XR) 37.5 MG 24 hr capsule 90 capsule 1 5/12/2021  No   Sig: TAKE 1 CAPSULE(37.5 MG) BY MOUTH DAILY   Sent to pharmacy as: venlafaxine ER 37.5 mg capsule,extended release 24 hr (EFFEXOR-XR)   E-Prescribing Status: Receipt confirmed by pharmacy (5/12/2021  8:48 PM CDT       venlafaxine (EFFEXOR-XR) 75 MG 24 hr capsule 90 capsule 1 3/10/2021  No   Sig - Route: Take 1 capsule (75 mg total) by mouth daily. - Oral   Sent to pharmacy as: venlafaxine ER 75 mg capsule,extended release 24 hr (EFFEXOR-XR)   E-Prescribing Status: Receipt confirmed by pharmacy (3/10/2021  4:02 PM CST)         Requested Prescriptions   Pending Prescriptions Disp Refills     venlafaxine (EFFEXOR-XR) 37.5 MG 24 hr capsule [Pharmacy Med Name: VENLAFAXINE ER 37.5MG  CAPSULES] 90 capsule 1     Sig: TAKE 1 CAPSULE(37.5 MG) BY MOUTH DAILY       Serotonin-Norepinephrine Reuptake Inhibitors  Failed - 11/10/2021  4:21 AM        Failed - Blood pressure under 140/90 in past 12 months     BP Readings from Last 3 Encounters:   01/10/20 102/72                 Failed - PHQ-9 score of less than 5 in past 6 months     Please review last PHQ-9 score.           Failed - Normal serum creatinine on file in past 12 months     No lab results found.    Ok to refill medication if creatinine is low          Failed - Recent (6 mo) or future (30 days) visit within the authorizing provider's specialty     Patient had office visit in the last 6 months or has a visit in the next 30 days with authorizing  "provider or within the authorizing provider's specialty.  See \"Patient Info\" tab in inbasket, or \"Choose Columns\" in Meds & Orders section of the refill encounter.            Passed - Medication is active on med list        Passed - Patient is age 18 or older        Passed - No active pregnancy on record        Passed - No positive pregnancy test in past 12 months           venlafaxine (EFFEXOR-XR) 75 MG 24 hr capsule [Pharmacy Med Name: VENLAFAXINE ER 75MG CAPSULES] 90 capsule 1     Sig: TAKE 1 CAPSULE(75 MG) BY MOUTH DAILY       Serotonin-Norepinephrine Reuptake Inhibitors  Failed - 11/10/2021  4:21 AM        Failed - Blood pressure under 140/90 in past 12 months     BP Readings from Last 3 Encounters:   01/10/20 102/72                 Failed - PHQ-9 score of less than 5 in past 6 months     Please review last PHQ-9 score.           Failed - Normal serum creatinine on file in past 12 months     No lab results found.    Ok to refill medication if creatinine is low          Failed - Recent (6 mo) or future (30 days) visit within the authorizing provider's specialty     Patient had office visit in the last 6 months or has a visit in the next 30 days with authorizing provider or within the authorizing provider's specialty.  See \"Patient Info\" tab in inbasket, or \"Choose Columns\" in Meds & Orders section of the refill encounter.            Passed - Medication is active on med list        Passed - Patient is age 18 or older        Passed - No active pregnancy on record        Passed - No positive pregnancy test in past 12 months             Dilcia Akins RN 11/10/21 9:23 PM  "

## 2021-11-18 ENCOUNTER — LAB (OUTPATIENT)
Dept: LAB | Facility: OTHER | Age: 21
End: 2021-11-18
Payer: COMMERCIAL

## 2021-11-18 DIAGNOSIS — Z79.899 NEED FOR PROPHYLACTIC CHEMOTHERAPY: Primary | ICD-10-CM

## 2021-11-18 LAB
AMPHETAMINES UR QL: NOT DETECTED
BARBITURATES UR QL SCN: NOT DETECTED
BENZODIAZ UR QL SCN: NOT DETECTED
BUPRENORPHINE UR QL: NOT DETECTED
CANNABINOIDS UR QL: NOT DETECTED
COCAINE UR QL SCN: NOT DETECTED
D-METHAMPHET UR QL: NOT DETECTED
METHADONE UR QL SCN: NOT DETECTED
OPIATES UR QL SCN: NOT DETECTED
OXYCODONE UR QL SCN: NOT DETECTED
PCP UR QL SCN: NOT DETECTED
PROPOXYPH UR QL: NOT DETECTED
TRICYCLICS UR QL SCN: NOT DETECTED

## 2021-11-18 PROCEDURE — 80306 DRUG TEST PRSMV INSTRMNT: CPT

## 2021-11-24 ENCOUNTER — VIRTUAL VISIT (OUTPATIENT)
Dept: PEDIATRICS | Facility: CLINIC | Age: 21
End: 2021-11-24
Payer: COMMERCIAL

## 2021-11-24 DIAGNOSIS — Z00.00 ROUTINE HISTORY AND PHYSICAL EXAMINATION OF ADULT: ICD-10-CM

## 2021-11-24 DIAGNOSIS — F40.10 SOCIAL ANXIETY DISORDER: Primary | ICD-10-CM

## 2021-11-24 DIAGNOSIS — F33.1 MODERATE EPISODE OF RECURRENT MAJOR DEPRESSIVE DISORDER (H): ICD-10-CM

## 2021-11-24 DIAGNOSIS — D64.9 ANEMIA, UNSPECIFIED TYPE: ICD-10-CM

## 2021-11-24 DIAGNOSIS — G47.10 EXCESSIVE SLEEPINESS: ICD-10-CM

## 2021-11-24 DIAGNOSIS — F90.8 ATTENTION DEFICIT HYPERACTIVITY DISORDER (ADHD), OTHER TYPE: ICD-10-CM

## 2021-11-24 PROBLEM — F32.9 MAJOR DEPRESSION: Status: RESOLVED | Noted: 2020-01-11 | Resolved: 2021-11-24

## 2021-11-24 PROBLEM — Z79.899 CONTROLLED SUBSTANCE AGREEMENT SIGNED: Status: RESOLVED | Noted: 2018-06-18 | Resolved: 2021-11-24

## 2021-11-24 PROCEDURE — 99214 OFFICE O/P EST MOD 30 MIN: CPT | Mod: GT | Performed by: PEDIATRICS

## 2021-11-24 PROCEDURE — 96127 BRIEF EMOTIONAL/BEHAV ASSMT: CPT | Mod: 59 | Performed by: PEDIATRICS

## 2021-11-24 RX ORDER — DEXMETHYLPHENIDATE HYDROCHLORIDE 20 MG/1
20 CAPSULE, EXTENDED RELEASE ORAL DAILY
Qty: 30 CAPSULE | Refills: 0 | Status: SHIPPED | OUTPATIENT
Start: 2021-11-24 | End: 2022-01-17

## 2021-11-24 ASSESSMENT — ANXIETY QUESTIONNAIRES
7. FEELING AFRAID AS IF SOMETHING AWFUL MIGHT HAPPEN: SEVERAL DAYS
GAD7 TOTAL SCORE: 6
7. FEELING AFRAID AS IF SOMETHING AWFUL MIGHT HAPPEN: SEVERAL DAYS
GAD7 TOTAL SCORE: 6
5. BEING SO RESTLESS THAT IT IS HARD TO SIT STILL: NOT AT ALL
3. WORRYING TOO MUCH ABOUT DIFFERENT THINGS: SEVERAL DAYS
4. TROUBLE RELAXING: SEVERAL DAYS
1. FEELING NERVOUS, ANXIOUS, OR ON EDGE: SEVERAL DAYS
6. BECOMING EASILY ANNOYED OR IRRITABLE: SEVERAL DAYS
8. IF YOU CHECKED OFF ANY PROBLEMS, HOW DIFFICULT HAVE THESE MADE IT FOR YOU TO DO YOUR WORK, TAKE CARE OF THINGS AT HOME, OR GET ALONG WITH OTHER PEOPLE?: SOMEWHAT DIFFICULT
GAD7 TOTAL SCORE: 6
2. NOT BEING ABLE TO STOP OR CONTROL WORRYING: SEVERAL DAYS

## 2021-11-24 ASSESSMENT — PATIENT HEALTH QUESTIONNAIRE - PHQ9
10. IF YOU CHECKED OFF ANY PROBLEMS, HOW DIFFICULT HAVE THESE PROBLEMS MADE IT FOR YOU TO DO YOUR WORK, TAKE CARE OF THINGS AT HOME, OR GET ALONG WITH OTHER PEOPLE: SOMEWHAT DIFFICULT
SUM OF ALL RESPONSES TO PHQ QUESTIONS 1-9: 9
SUM OF ALL RESPONSES TO PHQ QUESTIONS 1-9: 9

## 2021-11-24 NOTE — PROGRESS NOTES
Jimena is a 21 year old who is being evaluated via a billable video visit.      How would you like to obtain your AVS? MyChart  If the video visit is dropped, the invitation should be resent by: Text to cell phone: [phone]  Will anyone else be joining your video visit? No      Video Start Time: 4:05 PM    Assessment & Plan     Attention deficit hyperactivity disorder (ADHD), other type  Jimena is a 21 year old female with ADHD. She has been doing well with Focalin 20 mg daily. She is transitioning her care to psychiatry and is completing a new diagnostic evaluation and urine drug screen per their protocol. Will prescribe an additional month of medication to bridge to their management.  - dexmethylphenidate (FOCALIN XR) 20 MG 24 hr capsule  Dispense: 30 capsule; Refill: 0    Social anxiety disorder  Jimena has social anxiety. She has been working with a counselor and that has been helping. She continues on venlafaxine. Her psychiatrist has taken over that prescribing for her.    Moderate episode of recurrent major depressive disorder (H)  Jimena has depression. She is doing well with this overall. She is working with her counselor who has been helpful. She has also established with a psychiatrist. She continues to venlafaxine and that has been working well for her.    Anemia, unspecified type  She has a history of anemia and has had had follow up lab work since 2019. Will repeat this testing at her convenience.  - CBC with platelets and differential    Routine history and physical examination of adult  Will complete her routine lipid profile when testing for anemia.  - Lipid Profile (Chol, Trig, HDL, LDL calc)    Excessive sleepiness  Additionally Jimena continues to struggle with excessive sleepiness. She is interested in investigating this further. She has not had a sleep study. Sleep evaluation and management referral was entered.   - SLEEP EVALUATION & MANAGEMENT REFERRAL - ADULT  -    Additionally discussed transition of her care to an adult provider. She was scheduled with Dr. Martinez in January 2022 for a full physical at that time.       Ordering of each unique test  Prescription drug management  27 minutes spent on the date of the encounter doing chart review, history and exam, documentation and further activities per the note           Return in about 2 months (around 1/24/2022) for Routine preventive, or sooner as needed.    Leana Alvarado MD  [facility]    Reji Hess is a 21 year old who presents for the following health issues     History of Present Illness       Mental Health Follow-up:  Patient presents to follow-up on Depression & Anxiety.Patient's depression since last visit has been:  Good  The patient is not having other symptoms associated with depression.  Patient's anxiety since last visit has been:  Good  The patient is having other symptoms associated with anxiety.  Any significant life events: relationship concerns  Patient is feeling anxious or having panic attacks.  Patient has no concerns about alcohol or drug use.     Social History  Tobacco Use    Smoking status: Never Smoker    Smokeless tobacco: Never Used  Alcohol use: Yes    Comment: Alcoholic Drinks/day: occasional drink with friends, no binge drinking  Drug use: No      Today's PHQ-9         PHQ-9 Total Score:     (P) 9   PHQ-9 Q9 Thoughts of better off dead/self-harm past 2 weeks :   (P) Not at all   Thoughts of suicide or self harm:      Self-harm Plan:        Self-harm Action:          Safety concerns for self or others:           She eats 2-3 servings of fruits and vegetables daily.She consumes 1 sweetened beverage(s) daily.She exercises with enough effort to increase her heart rate 9 or less minutes per day.  She exercises with enough effort to increase her heart rate 3 or less days per week.   She is taking medications regularly.     Connected with Maria A at  [facility] - psychiatrist.    Meeting with Neelam from [facility] for counseling.    Depression and Anxiety Follow-Up    How are you doing with your depression since your last visit? No change    How are you doing with your anxiety since your last visit?  Doing well overall.    Are you having other symptoms that might be associated with depression or anxiety? Yes:  excessive sleepiness    Have you had a significant life event? Transitioning from [employer] to [employer] part time next term for school.     Do you have any concerns with your use of alcohol or other drugs? No    Social History     Tobacco Use     Smoking status: Never Smoker     Smokeless tobacco: Never Used   Substance Use Topics     Alcohol use: Yes     Comment: Alcoholic Drinks/day: occasional drink with friends, no binge drinking     Drug use: No     PHQ 9/16/2020 3/10/2021 11/24/2021   PHQ-9 Total Score 9 10 9   Q9: Thoughts of better off dead/self-harm past 2 weeks Not at all Not at all Not at all   Some encounter information is confidential and restricted. Go to Review Flowsheets activity to see all data.     SANTIAGO-7 SCORE 3/10/2021 4/13/2021 11/24/2021   Total Score - 9 (mild anxiety) 6 (mild anxiety)   Total Score 8 - 6   Some encounter information is confidential and restricted. Go to Review Flowsheets activity to see all data.                   Review of Systems         Objective    Vitals - Patient Reported  Weight (Patient Reported): 107 lb (48.5 kg)      Vitals:  No vitals were obtained today due to virtual visit.    Physical Exam   GENERAL: Healthy, alert and no distress  PSYCH: Mentation appears normal, affect normal, judgement and insight intact, normal speech and appearance well-groomed.                Video-Visit Details    Type of service:  Video Visit    Video End Time:4:19 PM    Originating Location (pt. Location): Home    Distant Location (provider location):  [facility]     Platform used  for Video Visit: Yu  Answers for HPI/ROS submitted by the patient on 11/24/2021  If you checked off any problems, how difficult have these problems made it for you to do your work, take care of things at home, or get along with other people?: Somewhat difficult  PHQ9 TOTAL SCORE: 9  SANTIAGO 7 TOTAL SCORE: 6

## 2021-11-25 ASSESSMENT — ANXIETY QUESTIONNAIRES: GAD7 TOTAL SCORE: 6

## 2021-11-25 ASSESSMENT — PATIENT HEALTH QUESTIONNAIRE - PHQ9: SUM OF ALL RESPONSES TO PHQ QUESTIONS 1-9: 9

## 2021-12-07 ENCOUNTER — E-VISIT (OUTPATIENT)
Dept: PEDIATRICS | Facility: CLINIC | Age: 21
End: 2021-12-07
Payer: COMMERCIAL

## 2021-12-07 DIAGNOSIS — Z20.822 SUSPECTED COVID-19 VIRUS INFECTION: ICD-10-CM

## 2021-12-07 DIAGNOSIS — J02.9 SORE THROAT: ICD-10-CM

## 2021-12-07 PROCEDURE — 99421 OL DIG E/M SVC 5-10 MIN: CPT | Performed by: PEDIATRICS

## 2021-12-07 NOTE — PATIENT INSTRUCTIONS
Dear Jimena Werner,    Your symptoms show that you may have coronavirus (COVID-19). This illness can cause fever, cough and trouble breathing. Many people get a mild case and get better on their own. Some people can get very sick.    Because you also reported sore throat I would like to also test you for Strep Throat to determine if we need to treat you for that as well.    What should I do?  We would like to test you for Covid-19 virus and Strep Throat. I have placed orders for these tests.   To schedule: go to your [employer] home page and scroll down to the section that says  You have an appointment that needs to be scheduled  and click the large green button that says  Schedule Now  and follow the steps to find the next available openings. It is important that when you are asked what the reason for your appointment is that you mention you need BOTH Covid and Strep tests.    If you are unable to complete these [employer] scheduling steps, please call [phone] to schedule your testing.     Return to work/school/ guidance:   Please let your workplace manager and staffing office know when your quarantine ends     We can t give you an exact date as it depends on the above. You can calculate this on your own or work with your manager/staffing office to calculate this. (For example if you were exposed on 10/4, you would have to quarantine for 14 full days. That would be through 10/18. You could return on 10/19.)      If you receive a positive COVID-19 test result, follow the guidance of the those who are giving you the results. Usually the return to work is 10 (or in some cases 20 days from symptom onset.) If you work at [employer], you must also be cleared by Employee Occupational Health and Safety to return to work.        If you receive a negative COVID-19 test result and did not have a high risk exposure to someone with a known positive COVID-19 test, you can return to work once you're free of  fever for 24 hours without fever-reducing medication and your symptoms are improving or resolved.      If you receive a negative COVID-19 test and If you had a high risk exposure to someone who has tested positive for COVID-19 then you can return to work 14 days after your last contact with the positive individual    Note: If you have ongoing exposure to the covid positive person, this quarantine period may be more than 14 days. (For example, if you are continued to be exposed to your child who tested positive and cannot isolate from them, then the quarantine of 7-14 days can't start until your child is no longer contagious. This is typically 10 days from onset of the child's symptoms. So the total duration may be 17-24 days in this case.)    Sign up for [employer].   We know it's scary to hear that you might have COVID-19. We want to track your symptoms to make sure you're okay over the next 2 weeks. Please look for an email from [employer]--this is a free, online program that we'll use to keep in touch. To sign up, follow the link in the email you will receive. Learn more at [email or web address]    How can I take care of myself?    Get lots of rest. Drink extra fluids (unless a doctor has told you not to)    Take Tylenol (acetaminophen) or ibuprofen for fever or pain. If you have liver or kidney problems, ask your family doctor if it's okay to take Tylenol o ibuprofen    If you have other health problems (like cancer, heart failure, an organ transplant or severe kidney disease): Call your specialty clinic if you don't feel better in the next 2 days.    Know when to call 911. Emergency warning signs include:  o Trouble breathing or shortness of breath  o Pain or pressure in the chest that doesn't go away  o Feeling confused like you haven't felt before, or not being able to wake up  o Bluish-colored lips or face    Where can I get more information?  [facility] - About COVID-19:    [email or web address]    CDC - What to Do If You're Sick:   www.cdc.gov/coronavirus/2019-ncov/about/steps-when-sick.html

## 2021-12-09 ENCOUNTER — LAB (OUTPATIENT)
Dept: FAMILY MEDICINE | Facility: CLINIC | Age: 21
End: 2021-12-09
Attending: PEDIATRICS
Payer: COMMERCIAL

## 2021-12-09 DIAGNOSIS — J02.9 SORE THROAT: ICD-10-CM

## 2021-12-09 DIAGNOSIS — Z20.822 SUSPECTED COVID-19 VIRUS INFECTION: ICD-10-CM

## 2021-12-09 LAB — DEPRECATED S PYO AG THROAT QL EIA: NEGATIVE

## 2021-12-09 PROCEDURE — 87651 STREP A DNA AMP PROBE: CPT

## 2021-12-09 PROCEDURE — U0003 INFECTIOUS AGENT DETECTION BY NUCLEIC ACID (DNA OR RNA); SEVERE ACUTE RESPIRATORY SYNDROME CORONAVIRUS 2 (SARS-COV-2) (CORONAVIRUS DISEASE [COVID-19]), AMPLIFIED PROBE TECHNIQUE, MAKING USE OF HIGH THROUGHPUT TECHNOLOGIES AS DESCRIBED BY CMS-2020-01-R: HCPCS

## 2021-12-09 PROCEDURE — U0005 INFEC AGEN DETEC AMPLI PROBE: HCPCS

## 2021-12-10 LAB
GROUP A STREP BY PCR: NOT DETECTED
SARS-COV-2 RNA RESP QL NAA+PROBE: NEGATIVE

## 2022-01-03 ENCOUNTER — E-VISIT (OUTPATIENT)
Dept: URGENT CARE | Facility: URGENT CARE | Age: 22
End: 2022-01-03
Payer: COMMERCIAL

## 2022-01-03 ENCOUNTER — NURSE TRIAGE (OUTPATIENT)
Dept: NURSING | Facility: CLINIC | Age: 22
End: 2022-01-03
Payer: COMMERCIAL

## 2022-01-03 DIAGNOSIS — Z20.822 CLOSE EXPOSURE TO 2019 NOVEL CORONAVIRUS: Primary | ICD-10-CM

## 2022-01-03 PROCEDURE — 99421 OL DIG E/M SVC 5-10 MIN: CPT | Performed by: PREVENTIVE MEDICINE

## 2022-01-03 NOTE — PATIENT INSTRUCTIONS
Dear Jimena,    Based on your exposure to COVID-19 (coronavirus), we would like to test you for this virus. I have placed an order for this test. The best time for testing is 5-7 days after the exposure.    How to schedule:  Go to your [employer] home page and scroll down to the section that says  You have an appointment that needs to be scheduled  and click the large green button that says  Schedule Now  and follow the steps to find the next available opening.     If you are unable to complete these [employer] scheduling steps, please call [phone] to schedule your testing.     Monoclonal antibody treatment after exposure:  Because you have been exposed to COVID-19, you may be able to receive a treatment with monoclonal antibodies. This treatment can lower your risk of severe illness and going to the hospital. It is given through an IV or under your skin (subcutaneous) and must be given at an infusion center.   To be eligible, you must be 12 years or older, at least 88 pounds and:    Are not fully vaccinated against COVID-19, OR    Are immunocompromised     If you would like to sign up to be considered to receive the monoclonal antibody medicine, please complete a participation form through the [place] Department of [facility] here:  MNRAP ([email or web address]). You may also call the [facility] COVID-19 Public Hotline at 1-270.202.3667 (open Mon-Fri: 9am-7pm and Sat: 10am-6pm).     Not all people who are eligible will receive the medicine since supply is limited. You will be contacted in the next 1 to 2 business days only if you are selected. If you do not receive a call, you have not been selected to receive the medicine. If you have any questions about this medication, please contact your primary care provider. For more information, see [email or web address]    Return to work/school/ guidance:   Please let your workplace manager  and staffing office know when your quarantine ends. We cannot give you an exact date as it depends on the information below. You can calculate this on your own or work with your manager/staffing office to calculate this.    Quarantine Guidelines:  You are considered exposed if you have been within 6 feet of an infected person(s) for 15 minutes or more over a 24-hour period. Quarantine will start after the LAST time you had contact with the infected person while they were contagious (for example, if you saw someone on Monday and Wednesday, your quarantine would start after Wednesday).     If you have NO symptoms (asymptomatic):    Stay home for 14 days (quarantine) after your LAST contact with a person who has COVID-19 (this remains the CDC recommendation for greatest protection against spread of COVID-19), OR    10-day quarantine with no test, OR    Minimum 7-day quarantine with negative RT-PCR test collected on day 5 or later    Quarantine Guideline exceptions:    People who have been fully vaccinated do not need to quarantine unless they have symptoms. You are considered fully vaccinated 2 weeks after your 2nd dose in a 2-dose series or 2 weeks after a single-dose series. This includes vaccinated health care workers.  o Fully vaccinated people should still get tested 5-7 days after exposure, even if they don t have symptoms.   Note: If you have ongoing exposure to the COVID-positive person, this quarantine period may be more than 14 days. For example, if you continue to be exposed to your child who tested positive and cannot isolate from them, then the quarantine of 7-14 days can't start until your child is no longer contagious. This is typically 10 days from onset of the child's symptoms. So, the total duration may be 17-24 days in this case.   Please contact your doctor if you have questions or call the [facility] Public Hotline: 1-485.251.1916 (Mon-Fri: 9am-7pm and Sat: 10am-6pm).     How to Quarantine:     Monitor  your symptoms until 14 days after your last exposure. If you develop signs or symptoms, isolate and get tested (even if you have been tested already).    Stay home and away from others. Don't go to school or anywhere else. Generally, quarantine means staying home from work but there are some exceptions to this. Please contact your workplace. Cover your mouth and nose with a face covering if you must be around other people.     Wash your hands and face often. Use soap and water.    What are the symptoms of COVID-19?  The most common symptoms are cough, fever and trouble breathing. Less common symptoms include headache, body aches, fatigue (feeling very tired), chills, sore throat, stuffy or runny nose, diarrhea (loose poop), loss of taste or smell, belly pain, and nausea or vomiting (feeling sick to your stomach or throwing up).  If you develop symptoms, follow these guidelines:    If you're normally healthy: Please start another eVisit.    If you have a serious health problem (like cancer, heart failure, an organ transplant or kidney disease): Call your specialty clinic. Let them know that you might have COVID-19.    Where can I get more information?    [facility] - About COVID-19: [email or web address]     CDC - What to Do If You're Sick:     www.cdc.gov/coronavirus/2019-ncov/about/steps-when-sick.html    CDC - Ending Home Isolation:  https://www.cdc.gov/coronavirus/2019-ncov/your-health/quarantine-isolation.html    CDC - Caring for Someone:  www.cdc.gov/coronavirus/2019-ncov/if-you-are-sick/care-for-someone.html    [facility] clinical trials (COVID-19 research studies): [email or web address]-clinical-trials    Below are the COVID-19 hotlines at the [facility] of Health ([facility]). Interpreters are available.  o For health questions: Call 974-960-2069 or 1-191.612.7476 (7 am to 7 pm)  o For questions about schools and childcare: Call 982-429-7572 or 1-198.778.5960 (7 a.m.  to 7 p.m.)

## 2022-01-03 NOTE — TELEPHONE ENCOUNTER
Triage call:     Patient calling to report that she had an exposure on  to a friend on 12/29 who has not tested, but has a known exposure to her brother and now has same symptoms her brother. Patient has no symptoms now and is fully vaccinated, but is concerned about keeping her appointment with the clinic this afternoon. Called [facility] who requested that she reschedule appointment.     According to the protocol, patient should schedule e-visit with telemedicine.  Care advice given. Patient verbalizes understanding and agrees with plan of care. Transferred to scheduling to reschedule well-visit.    Rossana Melgoza RN   01/03/22 1:09 PM  [facility] Nurse Advisor    COVID 19 Nurse Triage Plan/Patient Instructions    Please be aware that novel coronavirus (COVID-19) may be circulating in the community. If you develop symptoms such as fever, cough, or SOB or if you have concerns about the presence of another infection including coronavirus (COVID-19), please contact your health care provider or visit [email or web address].     Disposition/Instructions    Virtual Visit with provider recommended. Reference Visit Selection Guide.    Thank you for taking steps to prevent the spread of this virus.  o Limit your contact with others.  o Wear a simple mask to cover your cough.  o Wash your hands well and often.    Resources    [facility]: About COVID-19: [email or web address]    CDC: What to Do If You're Sick: www.cdc.gov/coronavirus/2019-ncov/about/steps-when-sick.html    CDC: Ending Home Isolation: www.cdc.gov/coronavirus/2019-ncov/hcp/disposition-in-home-patients.html     CDC: Caring for Someone: www.cdc.gov/coronavirus/2019-ncov/if-you-are-sick/care-for-someone.html     [facility]: Interim Guidance for Hospital Discharge to Home: www.[email or web address].[email or web address]/diseases/coronavirus/hcp/hospdischarge.pdf    [facility] clinical trials (COVID-19 research studies):  [email or web address]-clinical-trials     Below are the COVID-19 hotlines at the [place] Department of Health ([facility]). Interpreters are available.   o For health questions: Call 490-767-2365 or 1-203.181.3554 (7 a.m. to 7 p.m.)  o For questions about schools and childcare: Call 887-947-0640 or 1-125.458.4916 (7 a.m. to 7 p.m.)                        Reason for Disposition    [1] CLOSE CONTACT COVID-19 EXPOSURE within last 14 days AND [2] NO symptoms    Additional Information    Negative: COVID-19 lab test positive    Negative: [1] Lives with someone known to have influenza (flu test positive) AND [2] flu-like symptoms (e.g., cough, runny nose, sore throat, SOB; with or without fever)    Negative: [1] Symptoms of COVID-19 (e.g., cough, fever, SOB, or others) AND [2] HCP diagnosed COVID-19 based on symptoms    Negative: [1] Symptoms of COVID-19 (e.g., cough, fever, SOB, or others) AND [2] lives in an area with community spread    Negative: [1] Symptoms of COVID-19 (e.g., cough, fever, SOB, or others) AND [2] within 14 days of EXPOSURE (close contact) with diagnosed or suspected COVID-19 patient    Negative: [1] Symptoms of COVID-19 (e.g., cough, fever, SOB, or others) AND [2] within 14 days of travel from high-risk area for COVID-19 community spread (identified by CDC)    Negative: [1] Difficulty breathing (shortness of breath) occurs AND [2] onset > 14 days after COVID-19 EXPOSURE (Close Contact)    Negative: [1] Dry cough occurs AND [2] onset > 14 days after COVID-19 EXPOSURE    Negative: [1] Wet cough (i.e., white-yellow, yellow, green, or sonny colored sputum) AND [2] onset > 14 days after COVID-19 EXPOSURE    Negative: [1] Common cold symptoms AND [2] onset > 14 days after COVID-19 EXPOSURE    Negative: COVID-19 vaccine reaction suspected (e.g., fever, headache, muscle aches) occurring during days 1-3 after getting vaccine    Negative: COVID-19 vaccine, questions about    Negative: [1] CLOSE CONTACT  COVID-19 EXPOSURE within last 14 days AND [2] needs COVID-19 lab test to return to work AND [3] NO symptoms    Negative: [1] CLOSE CONTACT COVID-19 EXPOSURE within last 14 days AND [2] exposed person is a  (e.g., police or paramedic) AND [3] NO symptoms    Negative: [1] CLOSE CONTACT COVID-19 EXPOSURE within last 14 days AND [2] exposed person is a healthcare worker who was NOT using all recommended personal protective equipment (e.g., a respirator-N95 mask, eye protection, gloves, and gown) AND [3] NO symptoms    Negative: [1] Living or working in a correctional facility, long-term care facility, or shelter (i.e., congregate setting; densely populated) AND [2] where an outbreak has occurred AND [3] NO symptoms    Protocols used: CORONAVIRUS (COVID-19) EXPOSURE-A- 8.25.2021

## 2022-01-04 ENCOUNTER — LAB (OUTPATIENT)
Dept: LAB | Facility: CLINIC | Age: 22
End: 2022-01-04
Attending: PREVENTIVE MEDICINE
Payer: COMMERCIAL

## 2022-01-04 DIAGNOSIS — Z20.822 CLOSE EXPOSURE TO 2019 NOVEL CORONAVIRUS: ICD-10-CM

## 2022-01-04 PROCEDURE — U0003 INFECTIOUS AGENT DETECTION BY NUCLEIC ACID (DNA OR RNA); SEVERE ACUTE RESPIRATORY SYNDROME CORONAVIRUS 2 (SARS-COV-2) (CORONAVIRUS DISEASE [COVID-19]), AMPLIFIED PROBE TECHNIQUE, MAKING USE OF HIGH THROUGHPUT TECHNOLOGIES AS DESCRIBED BY CMS-2020-01-R: HCPCS

## 2022-01-04 PROCEDURE — U0005 INFEC AGEN DETEC AMPLI PROBE: HCPCS

## 2022-01-05 LAB — SARS-COV-2 RNA RESP QL NAA+PROBE: NEGATIVE

## 2022-01-17 ENCOUNTER — MYC REFILL (OUTPATIENT)
Dept: PEDIATRICS | Facility: CLINIC | Age: 22
End: 2022-01-17
Payer: COMMERCIAL

## 2022-01-17 DIAGNOSIS — F90.8 ATTENTION DEFICIT HYPERACTIVITY DISORDER (ADHD), OTHER TYPE: ICD-10-CM

## 2022-01-17 DIAGNOSIS — Z30.41 ENCOUNTER FOR SURVEILLANCE OF CONTRACEPTIVE PILLS: ICD-10-CM

## 2022-01-19 RX ORDER — NORGESTIMATE AND ETHINYL ESTRADIOL 0.25-0.035
1 KIT ORAL DAILY
Qty: 84 TABLET | Refills: 2 | Status: CANCELLED | OUTPATIENT
Start: 2022-01-19

## 2022-01-20 DIAGNOSIS — Z30.41 ENCOUNTER FOR SURVEILLANCE OF CONTRACEPTIVE PILLS: ICD-10-CM

## 2022-01-20 RX ORDER — DEXMETHYLPHENIDATE HYDROCHLORIDE 20 MG/1
20 CAPSULE, EXTENDED RELEASE ORAL DAILY
Qty: 30 CAPSULE | Refills: 0 | Status: SHIPPED | OUTPATIENT
Start: 2022-01-20 | End: 2023-04-06

## 2022-01-20 NOTE — TELEPHONE ENCOUNTER
Last seen by Jenny on 11/24/21. Medication last filled 11/24/21 #30.  Next appointment with Juan on 2/28/22.  AARON BROWN on 1/20/2022 at 2:06 PM

## 2022-01-20 NOTE — TELEPHONE ENCOUNTER
"Routing refill request to provider for review/approval because:  Controlled medication refill request.  Routing to provider's care team.    Last Written Prescription:      Last office visit provider:  11/24/21    Requested Prescriptions   Pending Prescriptions Disp Refills     norgestimate-ethinyl estradiol (ORTHO-CYCLEN) 0.25-35 MG-MCG tablet 84 tablet 4     Sig: Take 1 tablet by mouth daily       Contraceptives Protocol Passed - 1/17/2022  9:14 PM        Passed - Patient is not a current smoker if age is 35 or older        Passed - Recent (12 mo) or future (30 days) visit within the authorizing provider's specialty     Patient has had an office visit with the authorizing provider or a provider within the authorizing providers department within the previous 12 mos or has a future within next 30 days. See \"Patient Info\" tab in inbasket, or \"Choose Columns\" in Meds & Orders section of the refill encounter.              Passed - Medication is active on med list        Passed - No active pregnancy on record        Passed - No positive pregnancy test in past 12 months           dexmethylphenidate (FOCALIN XR) 20 MG 24 hr capsule 30 capsule 0     Sig: Take 1 capsule (20 mg) by mouth daily       There is no refill protocol information for this order            Eun Mora RN 01/19/22 8:49 PM  " decreased activity level/none

## 2022-01-23 RX ORDER — NORGESTIMATE AND ETHINYL ESTRADIOL 0.25-0.035
KIT ORAL
Qty: 84 TABLET | Refills: 0 | Status: SHIPPED | OUTPATIENT
Start: 2022-01-23 | End: 2022-02-28

## 2022-01-28 PROBLEM — F90.8 ATTENTION DEFICIT HYPERACTIVITY DISORDER (ADHD), OTHER TYPE: Chronic | Status: ACTIVE | Noted: 2018-06-06

## 2022-01-28 PROBLEM — F32.9 MAJOR DEPRESSIVE DISORDER WITH CURRENT ACTIVE EPISODE: Chronic | Status: ACTIVE | Noted: 2017-03-28

## 2022-01-28 PROBLEM — F40.10 SOCIAL ANXIETY DISORDER: Chronic | Status: ACTIVE | Noted: 2017-03-28

## 2022-02-02 PROBLEM — F32.81 PREMENSTRUAL DYSPHORIC DISORDER: Chronic | Status: ACTIVE | Noted: 2019-06-17

## 2022-02-04 ENCOUNTER — OFFICE VISIT (OUTPATIENT)
Dept: SLEEP MEDICINE | Facility: CLINIC | Age: 22
End: 2022-02-04
Attending: PEDIATRICS
Payer: COMMERCIAL

## 2022-02-04 ENCOUNTER — TELEPHONE (OUTPATIENT)
Dept: SLEEP MEDICINE | Facility: CLINIC | Age: 22
End: 2022-02-04

## 2022-02-04 VITALS
SYSTOLIC BLOOD PRESSURE: 113 MMHG | HEART RATE: 81 BPM | OXYGEN SATURATION: 98 % | WEIGHT: 108 LBS | BODY MASS INDEX: 19.14 KG/M2 | HEIGHT: 63 IN | DIASTOLIC BLOOD PRESSURE: 72 MMHG

## 2022-02-04 DIAGNOSIS — G47.10 EXCESSIVE SLEEPINESS: ICD-10-CM

## 2022-02-04 PROCEDURE — 99205 OFFICE O/P NEW HI 60 MIN: CPT | Performed by: PHYSICIAN ASSISTANT

## 2022-02-04 ASSESSMENT — MIFFLIN-ST. JEOR: SCORE: 1211.07

## 2022-02-04 NOTE — PATIENT INSTRUCTIONS
Your BMI is Body mass index is 19.44 kg/m .  Weight management is a personal decision.  If you are interested in exploring weight loss strategies, the following discussion covers the approaches that may be successful. Body mass index (BMI) is one way to tell whether you are at a healthy weight, overweight, or obese. It measures your weight in relation to your height.  A BMI of 18.5 to 24.9 is in the healthy range. A person with a BMI of 25 to 29.9 is considered overweight, and someone with a BMI of 30 or greater is considered obese. More than two-thirds of American adults are considered overweight or obese.  Being overweight or obese increases the risk for further weight gain. Excess weight may lead to heart disease and diabetes.  Creating and following plans for healthy eating and physical activity may help you improve your health.  Weight control is part of healthy lifestyle and includes exercise, emotional health, and healthy eating habits. Careful eating habits lifelong are the mainstay of weight control. Though there are significant health benefits from weight loss, long-term weight loss with diet alone may be very difficult to achieve- studies show long-term success with dietary management in less than 10% of people. Attaining a healthy weight may be especially difficult to achieve in those with severe obesity. In some cases, medications, devices and surgical management might be considered.  What can you do?  If you are overweight or obese and are interested in methods for weight loss, you should discuss this with your provider.     Consider reducing daily calorie intake by 500 calories.     Keep a food journal.     Avoiding skipping meals, consider cutting portions instead.    Diet combined with exercise helps maintain muscle while optimizing fat loss. Strength training is particularly important for building and maintaining muscle mass. Exercise helps reduce stress, increase energy, and improves fitness.  Increasing exercise without diet control, however, may not burn enough calories to loose weight.       Start walking three days a week 10-20 minutes at a time    Work towards walking thirty minutes five days a week     Eventually, increase the speed of your walking for 1-2 minutes at time    And look into health and wellness programs that may be available through your health insurance provider, employer, local community center, or amalia club.

## 2022-02-04 NOTE — NURSING NOTE
"Chief Complaint   Patient presents with     Sleep Problem     excessive sleeping. Sleeps from 9-10 hours but feels she still needs more sleep.        Initial /72   Pulse 81   Ht 1.588 m (5' 2.5\")   Wt 49 kg (108 lb)   SpO2 98%   BMI 19.44 kg/m   Estimated body mass index is 19.44 kg/m  as calculated from the following:    Height as of this encounter: 1.588 m (5' 2.5\").    Weight as of this encounter: 49 kg (108 lb).    Medication Reconciliation: complete    Neck circumference: 30 centimeters.    Bhavana Parsons CMA on 2/4/2022 at 1:22 PM   "

## 2022-02-04 NOTE — PROGRESS NOTES
"  Sleep Consultation:    Date on this visit: 2/4/2022    Jimena Werner is sent by Leana Alvarado for a sleep consultation regarding excessive daytime sleepiness.    Primary Physician: Leana Gomez     CC: The patient complains of non-restorative sleep, stating \"I am sleeping too much\". She is sent to [facility] for a consultation.     HPI: Jimena Werner is a 21 year old female with medical history remarkable for ADHD diagnosed in 2018, depression and social anxiety disorder.     Patient complains of falling sleep at inopportune times only when her classes are early in the morning. She reports daytime somnolence history of about 6 years.  She reports that her EDS is constant and daily. This started around the time she was trying to get help with mental health problems.  She reports Focalin (for treatment of ADHD) is effective but she does not want to take it if she does not wake up early as the medication interferes with night sleep.   Patient has history of depression and reports that her symptoms are well managed.    She did complete actigraphy and 2 weeks of sleep log in March of 2017.  Consistent with delayed sleep phase tendencies with average bedtime of around 2 AM. Frequent daytime naps also noted.   Schedule:  She works at [employer]. Works 5 days (1 PM-9 PM)  Class (M-Th) at 2 PM-4 PM  She is planning on stopping work on the 5th.   She started school in January.     Jimena goes to sleep between 11-2 AM (average 12 AM) during the week. She wakes up at 11:00 AM with an alarm. She falls asleep in 20 minutes. She reports that she will not have difficulty falling asleep even if she goes to bed at 10:30 AM.  Jimena denies difficulty falling asleep.  She wakes up 1 time a night for 30-60 minutes before falling back to sleep.  Jimena wakes up to go to the bathroom and uncertain reasons.  On weekends, Jimena goes to sleep at 11:00 PM.  She wakes up at 11:00 " AM without an alarm. She falls asleep in 20 minutes.  Patient gets an average of 9-10 hours of sleep per night.     Patient does watch TV in bed.     Jimena does do shift work.      Jimena does not snore. Patient does not have a regular bed partner. There is not report of snoring.  She does not have witnessed apneas. Patient sleeps on her side and stomach. She denies no morning headaches, morning confusion and restless legs. Jimena denies any bruxism, sleep walking, sleep talking, dream enactment, cataplexy and hypnogogic/hypnopompic hallucinations. She reports occasional sleep paralysis.     Jimena denies difficulty breathing through her nose.      Jimena has gained 0-5 pounds in the last year.  Patient describes themself as a night person.  She would prefer to go to sleep at 11:00 PM and wake up at 9:00 AM.  Patient's [place] Sleepiness score 12/24 consistent with mild daytime sleepiness.      Jimena does not nap because she is unable to fall asleep. She does not drive.  She uses no caffeine.  She reports occasional alcohol.     Allergies:    Allergies   Allergen Reactions     Brassica Oleracea Italica Itching     More of a sensitivity- has Mouth itching  More of a sensitivity- has Mouth itching       Medications:    Current Outpatient Medications   Medication Sig Dispense Refill     dexmethylphenidate (FOCALIN XR) 20 MG 24 hr capsule Take 1 capsule (20 mg) by mouth daily 30 capsule 0     ferrous sulfate 325 (65 FE) MG tablet [FERROUS SULFATE 325 (65 FE) MG TABLET] Take 1 tablet (325 mg total) by mouth daily with breakfast. 90 tablet 0     COOPER 0.25-35 MG-MCG tablet TAKE 1 TABLET BY MOUTH EVERY DAY 84 tablet 0     venlafaxine (EFFEXOR-XR) 37.5 MG 24 hr capsule TAKE 1 CAPSULE(37.5 MG) BY MOUTH DAILY 90 capsule 1     venlafaxine (EFFEXOR-XR) 75 MG 24 hr capsule TAKE 1 CAPSULE(75 MG) BY MOUTH DAILY 90 capsule 1       Problem List:  Patient Active Problem List    Diagnosis Date Noted     Premenstrual  dysphoric disorder 06/17/2019     Priority: Medium     Attention deficit hyperactivity disorder (ADHD), other type, dx 4/12/18 06/06/2018     Priority: Medium     Social anxiety disorder 03/28/2017     Priority: Medium     Major depressive disorder with current active episode 03/28/2017     Priority: Medium     Acne      Priority: Medium     Created by Conversion            Past Medical/Surgical History:  Past Medical History:   Diagnosis Date     Osteochondritis dissecans      Past Surgical History:   Procedure Laterality Date     HC REMOVAL OF TONSILS,<10 Y/O      Description: Tonsillectomy;  Recorded: 03/03/2010;       Social History:  Social History     Socioeconomic History     Marital status: Single     Spouse name: Not on file     Number of children: Not on file     Years of education: Not on file     Highest education level: Not on file   Occupational History     Not on file   Tobacco Use     Smoking status: Never Smoker     Smokeless tobacco: Never Used   Substance and Sexual Activity     Alcohol use: Yes     Comment: Alcoholic Drinks/day: occasional drink with friends, no binge drinking     Drug use: No     Sexual activity: Never   Other Topics Concern     Not on file   Social History Narrative    Lives at home with mother and father.     Social Determinants of Health     Financial Resource Strain: Not on file   Food Insecurity: Not on file   Transportation Needs: Not on file   Physical Activity: Not on file   Stress: Not on file   Social Connections: Not on file   Intimate Partner Violence: Not on file   Housing Stability: Not on file       Family History:  Family History   Problem Relation Age of Onset     Hypothyroidism Maternal Grandmother        Review of Systems:  A complete review of systems reviewed by me is negative with the exeption of what has been mentioned in the history of present illness.  CONSTITUTIONAL:  POSITIVE for  night sweats and NEGATIVE for  weight gain, weight loss, fever  and  "chills  EYES: NEGATIVE for changes in vision, blind spots, double vision.  ENT: NEGATIVE for ear pain, sore throat, sinus pain, post-nasal drip, runny nose, bloody nose  CARDIAC: NEGATIVE for fast heartbeats or fluttering in chest, chest pain or pressure, breathlessness when lying flat, swollen legs or swollen feet.  NEUROLOGIC: NEGATIVE headaches, weakness or numbness in the arms or legs.  DERMATOLOGIC: NEGATIVE for rashes, new moles or change in mole(s)  PULMONARY: NEGATIVE SOB at rest, SOB with activity, dry cough, productive cough, coughing up blood, wheezing or whistling when breathing.    GASTROINTESTINAL: NEGATIVE for nausea or vomitting, loose or watery stools, fat or grease in stools, constipation, abdominal pain, bowel movements black in color or blood noted.  GENITOURINARY: NEGATIVE for pain during urination, blood in urine, urinating more frequently than usual, irregular menstrual periods.  MUSCULOSKELETAL: NEGATIVE for muscle pain, bone or joint pain, swollen joints.  ENDOCRINE: NEGATIVE for increased thirst or urination, diabetes.  LYMPHATIC: NEGATIVE for swollen lymph nodes, lumps or bumps in the breasts or nipple discharge.    Physical Examination:  Vitals: /72   Pulse 81   Ht 1.588 m (5' 2.5\")   Wt 49 kg (108 lb)   SpO2 98%   BMI 19.44 kg/m    BMI= Body mass index is 19.44 kg/m .    Neck Cir (cm): 30 cm    Delaplane Total Score 2/4/2022   Total score - Delaplane 12       TAWANNA Total Score: 10 (02/04/22 1300)    GENERAL APPEARANCE: alert and no distress  EYES: Eyes grossly normal to inspection, PERRL and conjunctivae and sclerae normal  HENT: nose and mouth without ulcers or lesions  NECK: no adenopathy, no asymmetry, masses, or scars and thyroid normal to palpation  RESP: lungs clear to auscultation - no rales, rhonchi or wheezes  CV: regular rates and rhythm, normal S1 S2, no S3 or S4 and no murmur, click or rub  NEURO: Normal strength and tone, mentation intact and speech normal  PSYCH: " anxious      Last Comprehensive Metabolic Panel:  No results found for: NA, POTASSIUM, CHLORIDE, CO2, ANIONGAP, GLC, BUN, CR, GFRESTIMATED, DULCE     UDS 11/18/2021   Ref Range & Units 2 mo ago     Cannabinoids (51-izi-3-carboxy-9-THC) Not Detected, Indeterminate Not Detected    Comment: Cutoff for a negative cannabinoid is 50 ng/mL or less.    Phencyclidine Not Detected, Indeterminate Not Detected    Comment: Cutoff for a negative PCP is 25 ng/mL or less.    Cocaine (Benzoylecgonine) Not Detected, Indeterminate Not Detected    Comment: Cutoff for a negative cocaine is 150 ng/ml or less.    Methamphetamine (d-Methamphetamine) Not Detected, Indeterminate Not Detected    Comment: Cutoff for a negative methamphetamine is 500 ng/ml or less.    Opiates (Morphine) Not Detected, Indeterminate Not Detected    Comment: Cutoff for a negative opiate is 100 ng/ml or less.    Amphetamine (d-Amphetamine) Not Detected, Indeterminate Not Detected    Comment: Cutoff for a negative amphetamine is 500 ng/mL or less.    Benzodiazepines (Nordiazepam) Not Detected, Indeterminate Not Detected    Comment: Cutoff for a negative benzodiazepine is 150 ng/ml or less.    Tricyclic Antidepressants (Desipramine) Not Detected, Indeterminate Not Detected    Comment: Cutoff for a negative tricyclic antidepressant is 300 ng/ml or less.    Methadone Not Detected, Indeterminate Not Detected    Comment: Cutoff for a negative methadone is 200 ng/ml or less.    Barbiturates (Butalbital) Not Detected, Indeterminate Not Detected    Comment: Cutoff for a negative barbituate is 200 ng/ml or less.    Oxycodone Not Detected, Indeterminate Not Detected    Comment: Cutoff for a negative oxycodone is 100 ng/mL or less.    Propoxyphene (Norpropoxyphene) Not Detected, Indeterminate Not Detected    Comment: Cutoff for a negative propoxyphene is 300 ng/ml or less.    Buprenorphine Not Detected, Indeterminate Not Detected    Comment: Cutoff for a negative buprenorphine  is 10 ng/ml or less.   Resulting Agency  [facility]       Impression/Plan:  Excessive daytime sleepiness without significant features of narcolpsy-   Differential diagnosis:  -inadequate total sleep time, reports 9-10 hours of sleep per night.   -sleep disruption/obstructive sleep apnea, not likely with a STOP-BANG score of 1/8.   -medications possible though not clearly temporally related  -circadian misalignment likely  -idiopathic hypersomnolence possible, difficult to evaluate with current antidepressant and stimulant use.     At this point, there could be a few factors at play. She likely  Has some degree of circadian misalignment and also prolonged sleep requirement. Though idiopathic hypersomnolence is possible, we  will not pursue multiple sleep latency testing at this time but instead we will first focus on optimizing the duration and circadian timing of sleep. Once that is addressed, we may certainly pursue multiple sleep latency testing.     In this context, we will take advantage of the fact that the patient is on Focalin XR 20 mg daily for treatment of ADHD. She does affirm that Focalin is effective in maintaining alertness, but chooses not to take it daily because she does not wake up early to take the medication. When she takes it late Focalin interferes with her ability to fall asleep.   Patient counseled to set an alarm for 8 AM to wake up. She reports that she has SAD light and will use that for 30 minutes upon waking up.  She will then eat breakfast and take her Focalin. She will go to bed when tired, but will go to bed between 10:30 and 11: PM, if sleep latency is not prolonged.  She will also limit light at night and maintain the same sleep and wake times everyday.     Follow up in 3 months.     65 minutes spent on day of encounter doing chart review,  history and exam, counseling, coordinating plan of care, documentation and further activities as noted above.      Gonzalo Rey PA-C      CC: Leana MAZARIEGOS  Jenny

## 2022-02-04 NOTE — NURSING NOTE
Writer was not able to find patient in the clinic. Writer called patient and VM was full. Unable to leave a message.     Upon calling back, please schedule patient to follow up with Gonzalo Rey PA-C, in May 22, 2022.

## 2022-02-28 ENCOUNTER — OFFICE VISIT (OUTPATIENT)
Dept: FAMILY MEDICINE | Facility: CLINIC | Age: 22
End: 2022-02-28
Payer: COMMERCIAL

## 2022-02-28 VITALS
HEART RATE: 76 BPM | HEIGHT: 63 IN | BODY MASS INDEX: 19.33 KG/M2 | WEIGHT: 109.1 LBS | DIASTOLIC BLOOD PRESSURE: 72 MMHG | SYSTOLIC BLOOD PRESSURE: 104 MMHG

## 2022-02-28 DIAGNOSIS — F32.81 PREMENSTRUAL DYSPHORIC DISORDER: ICD-10-CM

## 2022-02-28 DIAGNOSIS — F33.1 MODERATE EPISODE OF RECURRENT MAJOR DEPRESSIVE DISORDER (H): ICD-10-CM

## 2022-02-28 DIAGNOSIS — D64.9 ANEMIA, UNSPECIFIED TYPE: ICD-10-CM

## 2022-02-28 DIAGNOSIS — Z30.41 ENCOUNTER FOR SURVEILLANCE OF CONTRACEPTIVE PILLS: ICD-10-CM

## 2022-02-28 DIAGNOSIS — F90.8 ATTENTION DEFICIT HYPERACTIVITY DISORDER (ADHD), OTHER TYPE: ICD-10-CM

## 2022-02-28 DIAGNOSIS — Z00.00 ROUTINE GENERAL MEDICAL EXAMINATION AT A HEALTH CARE FACILITY: ICD-10-CM

## 2022-02-28 DIAGNOSIS — Z00.00 ROUTINE HISTORY AND PHYSICAL EXAMINATION OF ADULT: ICD-10-CM

## 2022-02-28 DIAGNOSIS — Z76.89 ENCOUNTER TO ESTABLISH CARE: Primary | ICD-10-CM

## 2022-02-28 DIAGNOSIS — Z12.4 SCREENING FOR CERVICAL CANCER: ICD-10-CM

## 2022-02-28 LAB
BASOPHILS # BLD AUTO: 0 10E3/UL (ref 0–0.2)
BASOPHILS NFR BLD AUTO: 0 %
CHOLEST SERPL-MCNC: 127 MG/DL
EOSINOPHIL # BLD AUTO: 0.1 10E3/UL (ref 0–0.7)
EOSINOPHIL NFR BLD AUTO: 1 %
ERYTHROCYTE [DISTWIDTH] IN BLOOD BY AUTOMATED COUNT: 12.7 % (ref 10–15)
FASTING STATUS PATIENT QL REPORTED: NORMAL
HCT VFR BLD AUTO: 37.7 % (ref 35–47)
HDLC SERPL-MCNC: 62 MG/DL
HGB BLD-MCNC: 12.7 G/DL (ref 11.7–15.7)
HIV 1+2 AB+HIV1 P24 AG SERPL QL IA: NEGATIVE
IMM GRANULOCYTES # BLD: 0 10E3/UL
IMM GRANULOCYTES NFR BLD: 0 %
LDLC SERPL CALC-MCNC: 56 MG/DL
LYMPHOCYTES # BLD AUTO: 2.6 10E3/UL (ref 0.8–5.3)
LYMPHOCYTES NFR BLD AUTO: 28 %
MCH RBC QN AUTO: 31.5 PG (ref 26.5–33)
MCHC RBC AUTO-ENTMCNC: 33.7 G/DL (ref 31.5–36.5)
MCV RBC AUTO: 94 FL (ref 78–100)
MONOCYTES # BLD AUTO: 0.9 10E3/UL (ref 0–1.3)
MONOCYTES NFR BLD AUTO: 10 %
NEUTROPHILS # BLD AUTO: 5.8 10E3/UL (ref 1.6–8.3)
NEUTROPHILS NFR BLD AUTO: 61 %
PLATELET # BLD AUTO: 313 10E3/UL (ref 150–450)
RBC # BLD AUTO: 4.03 10E6/UL (ref 3.8–5.2)
TRIGL SERPL-MCNC: 46 MG/DL
WBC # BLD AUTO: 9.4 10E3/UL (ref 4–11)

## 2022-02-28 PROCEDURE — 96127 BRIEF EMOTIONAL/BEHAV ASSMT: CPT | Performed by: FAMILY MEDICINE

## 2022-02-28 PROCEDURE — 87491 CHLMYD TRACH DNA AMP PROBE: CPT | Performed by: FAMILY MEDICINE

## 2022-02-28 PROCEDURE — 90471 IMMUNIZATION ADMIN: CPT | Performed by: FAMILY MEDICINE

## 2022-02-28 PROCEDURE — 87389 HIV-1 AG W/HIV-1&-2 AB AG IA: CPT | Performed by: FAMILY MEDICINE

## 2022-02-28 PROCEDURE — 85025 COMPLETE CBC W/AUTO DIFF WBC: CPT | Performed by: FAMILY MEDICINE

## 2022-02-28 PROCEDURE — 90686 IIV4 VACC NO PRSV 0.5 ML IM: CPT | Performed by: FAMILY MEDICINE

## 2022-02-28 PROCEDURE — 99385 PREV VISIT NEW AGE 18-39: CPT | Mod: 25 | Performed by: FAMILY MEDICINE

## 2022-02-28 PROCEDURE — 80061 LIPID PANEL: CPT | Performed by: FAMILY MEDICINE

## 2022-02-28 PROCEDURE — 90714 TD VACC NO PRESV 7 YRS+ IM: CPT | Performed by: FAMILY MEDICINE

## 2022-02-28 PROCEDURE — 36415 COLL VENOUS BLD VENIPUNCTURE: CPT | Performed by: FAMILY MEDICINE

## 2022-02-28 PROCEDURE — 87591 N.GONORRHOEAE DNA AMP PROB: CPT | Performed by: FAMILY MEDICINE

## 2022-02-28 PROCEDURE — 86803 HEPATITIS C AB TEST: CPT | Performed by: FAMILY MEDICINE

## 2022-02-28 PROCEDURE — G0123 SCREEN CERV/VAG THIN LAYER: HCPCS | Performed by: FAMILY MEDICINE

## 2022-02-28 RX ORDER — NORGESTIMATE AND ETHINYL ESTRADIOL 0.25-0.035
1 KIT ORAL DAILY
Qty: 84 TABLET | Refills: 2 | Status: SHIPPED | OUTPATIENT
Start: 2022-02-28 | End: 2022-12-15

## 2022-02-28 ASSESSMENT — ANXIETY QUESTIONNAIRES
4. TROUBLE RELAXING: MORE THAN HALF THE DAYS
1. FEELING NERVOUS, ANXIOUS, OR ON EDGE: SEVERAL DAYS
6. BECOMING EASILY ANNOYED OR IRRITABLE: NOT AT ALL
7. FEELING AFRAID AS IF SOMETHING AWFUL MIGHT HAPPEN: NOT AT ALL
3. WORRYING TOO MUCH ABOUT DIFFERENT THINGS: SEVERAL DAYS
5. BEING SO RESTLESS THAT IT IS HARD TO SIT STILL: NOT AT ALL
GAD7 TOTAL SCORE: 5
2. NOT BEING ABLE TO STOP OR CONTROL WORRYING: SEVERAL DAYS
IF YOU CHECKED OFF ANY PROBLEMS ON THIS QUESTIONNAIRE, HOW DIFFICULT HAVE THESE PROBLEMS MADE IT FOR YOU TO DO YOUR WORK, TAKE CARE OF THINGS AT HOME, OR GET ALONG WITH OTHER PEOPLE: SOMEWHAT DIFFICULT

## 2022-02-28 NOTE — PROGRESS NOTES
SUBJECTIVE:   CC: Jimena Werner is an 21 year old woman who presents for preventive health visit.       Patient has been advised of split billing requirements and indicates understanding: Yes  Healthy Habits:     Getting at least 3 servings of Calcium per day:  Yes    Bi-annual eye exam:  Yes    Dental care twice a year:  Yes    Sleep apnea or symptoms of sleep apnea:  None    Diet:  Regular (no restrictions) and Other    Frequency of exercise:  2-3 days/week    Duration of exercise:  45-60 minutes    Taking medications regularly:  Yes    Medication side effects:  None    PHQ-2 Total Score: 2    Additional concerns today:  Yes    Chief Complaint   Patient presents with     Physical     She has transitioned her mental health care to [facility] and doing therapy & psychiatrist for meds.   Her Focalin was decreased from 25mg to 20mg and managing this better.   Her venlafaxine is 75+37.5mg daily.     She is on an OCP for PMDD and this has helped significantly. NO longer noticing as significant of mood decrease those few days before her cycles.   Would like a refill of this.  Her periods are normal/not heavy    She has a hx of tiredness before starting any psych meds and started on iron for energy and this helps.    She recently saw sleep doctor. She is trying to get to be more consistently and doing the sunlight/SAD light for early AM wakening.      Social History     Social History Narrative    Lives at home with mother and father.    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  Currently in statistics and composition. She reports she barely graduated high school due to late dx of ADHD by her report.    Used to work for [employer] until Jan 2022.    Breana Martinez MD       Today's PHQ-2 Score:   PHQ-2 ( 1999 Pfizer) 2/27/2022   Q1: Little interest or pleasure in doing things 1   Q2: Feeling down, depressed or hopeless 1   PHQ-2 Score 2   Q1: Little interest or  pleasure in doing things Several days   Q2: Feeling down, depressed or hopeless Several days   PHQ-2 Score 2       Abuse: Current or Past (Physical, Sexual or Emotional) - No  Do you feel safe in your environment? Yes    Have you ever done Advance Care Planning? (For example, a Health Directive, POLST, or a discussion with a medical provider or your loved ones about your wishes): No, advance care planning information given to patient to review.  Patient plans to discuss their wishes with loved ones or provider.      Social History     Tobacco Use     Smoking status: Never Smoker     Smokeless tobacco: Never Used   Substance Use Topics     Alcohol use: Yes     Comment: Alcoholic Drinks/day: occasional drink with friends, no binge drinking     If you drink alcohol do you typically have >3 drinks per day or >7 drinks per week? No    Alcohol Use 2/27/2022   Prescreen: >3 drinks/day or >7 drinks/week? No   No flowsheet data found.    Reviewed orders with patient.  Reviewed health maintenance and updated orders accordingly - Yes  Breast Cancer Screening:        History of abnormal Pap smear: NO - age 21-29 PAP every 3 years recommended     Reviewed and updated as needed this visit by clinical staff   Tobacco  Allergies  Meds  Problems  Med Hx  Surg Hx  Fam Hx          Reviewed and updated as needed this visit by Provider   Tobacco  Allergies  Meds  Problems  Med Hx  Surg Hx  Fam Hx           Review of Systems  CONSTITUTIONAL: NEGATIVE for fever, chills, change in weight  INTEGUMENTARU/SKIN: NEGATIVE for worrisome rashes, moles or lesions  EYES: NEGATIVE for vision changes or irritation  ENT: NEGATIVE for ear, mouth and throat problems  RESP: NEGATIVE for significant cough or SOB  BREAST: NEGATIVE for masses, tenderness or discharge  CV: NEGATIVE for chest pain, palpitations or peripheral edema  GI: NEGATIVE for nausea, abdominal pain, heartburn, or change in bowel habits  : NEGATIVE for unusual urinary or  vaginal symptoms. Periods are regular.  MUSCULOSKELETAL: NEGATIVE for significant arthralgias or myalgia  NEURO: NEGATIVE for weakness, dizziness or paresthesias  PSYCHIATRIC: NEGATIVE for changes in mood or affect     OBJECTIVE:   There were no vitals taken for this visit.  Physical Exam  GENERAL: healthy, alert and no distress  EYES: Eyes grossly normal to inspection, PERRL and conjunctivae and sclerae normal  HENT: ear canals and TM's normal, nose and mouth without ulcers or lesions  NECK: no adenopathy, no asymmetry, masses, or scars and thyroid normal to palpation  RESP: lungs clear to auscultation - no rales, rhonchi or wheezes  BREAST: declined  CV: regular rate and rhythm, normal S1 S2, no S3 or S4, no murmur, click or rub, no peripheral edema and peripheral pulses strong  ABDOMEN: soft, nontender, no hepatosplenomegaly, no masses and bowel sounds normal   (female): normal female external genitalia, normal urethral meatus, vaginal mucosa pink, moist, well rugated, and normal cervix/adnexa/uterus without masses or discharge  MS: no gross musculoskeletal defects noted, no edema  SKIN: no suspicious lesions or rashes  NEURO: Normal strength and tone, mentation intact and speech normal  PSYCH: mentation appears normal, affect normal/bright    Diagnostic Test Results:  Labs reviewed in Epic    ASSESSMENT/PLAN:   Jimena was seen today for physical.    Diagnoses and all orders for this visit:    Encounter to establish care    Routine general medical examination at a health care facility  Psychiatry is managing her mental health (including the venlafaxine and dexmethylphenidate Rxs)  -     Chlamydia & Gonorrhea by PCR, [facility] - Clinic Collect  -     TD PRESERV FREE, IM (7+ YRS) (DECAVAC/TENIVAC)  -     Pap screen only - recommended age 21 - 24 years  -     HIV Antigen Antibody Combo; Future  -     Hepatitis C antibody; Future  -     HIV Antigen Antibody Combo  -     Hepatitis C antibody    Encounter for  "surveillance of contraceptive pills  Premenstrual dysphoric disorder  - She elects to continue her current form of birth control: oral contraceptives (estrogen/progesterone).   - Good candidate for this, with no h/o liver or clotting or kidney issues. No migraines w/ aura. Nonsmoker.   - Side effect profile reviewed including effects on spotting, nausea/ headaches, and emotional lability and risk for DVT/VTE.     - Understands annual follow up for med check or sooner if concerns arise  -     norgestimate-ethinyl estradiol (COOPER) 0.25-35 MG-MCG tablet; Take 1 tablet by mouth daily    Screening for cervical cancer  -     Pap screen only - recommended age 21 - 24 years    Anemia, unspecified type  -     CBC with platelets and differential    Other orders  -     NH FLU VAC PRESRV FREE QUAD SPLIT VIR IM  MONTHS IM        Patient has been advised of split billing requirements and indicates understanding: Yes    COUNSELING:  Reviewed preventive health counseling, as reflected in patient instructions    Estimated body mass index is 19.44 kg/m  as calculated from the following:    Height as of 2/4/22: 1.588 m (5' 2.5\").    Weight as of 2/4/22: 49 kg (108 lb).    Weight management plan: Discussed healthy diet and exercise guidelines    She reports that she has never smoked. She has never used smokeless tobacco.      Counseling Resources:  ATP IV Guidelines  Pooled Cohorts Equation Calculator  Breast Cancer Risk Calculator  BRCA-Related Cancer Risk Assessment: FHS-7 Tool  FRAX Risk Assessment  ICSI Preventive Guidelines  Dietary Guidelines for Americans, 2010  USDA's MyPlate  ASA Prophylaxis  Lung CA Screening    Breana Martinez MD  [facility]  "

## 2022-03-01 LAB
C TRACH DNA SPEC QL PROBE+SIG AMP: NEGATIVE
HCV AB SERPL QL IA: NEGATIVE
N GONORRHOEA DNA SPEC QL NAA+PROBE: NEGATIVE

## 2022-03-01 ASSESSMENT — PATIENT HEALTH QUESTIONNAIRE - PHQ9: SUM OF ALL RESPONSES TO PHQ QUESTIONS 1-9: 8

## 2022-03-02 LAB
BKR LAB AP GYN ADEQUACY: NORMAL
BKR LAB AP GYN INTERPRETATION: NORMAL
BKR LAB AP HPV REFLEX: NO
BKR LAB AP LMP: NORMAL
BKR LAB AP PREVIOUS ABNORMAL: NORMAL
PATH REPORT.COMMENTS IMP SPEC: NORMAL
PATH REPORT.COMMENTS IMP SPEC: NORMAL
PATH REPORT.RELEVANT HX SPEC: NORMAL

## 2022-03-22 ENCOUNTER — E-VISIT (OUTPATIENT)
Dept: FAMILY MEDICINE | Facility: CLINIC | Age: 22
End: 2022-03-22
Payer: COMMERCIAL

## 2022-03-22 DIAGNOSIS — R35.0 URINARY FREQUENCY: Primary | ICD-10-CM

## 2022-03-22 PROCEDURE — 99421 OL DIG E/M SVC 5-10 MIN: CPT | Performed by: FAMILY MEDICINE

## 2022-03-22 NOTE — PATIENT INSTRUCTIONS
Dear Jimena Werner,     After reviewing your responses, I would like you to come in for a urine test to make sure we treat you correctly. This urine test is to evaluate you for a possible urinary tract infection, and should be scheduled for today or tomorrow. Schedule a Lab Only appointment here.     Lab appointments are not available at most locations on the weekends. If no Lab Only appointment is available, you should be seen in any of our convenient Walk-in or Urgent Care Centers, which can be found on our website here.     You will receive instructions with your results in [employer] once they are available.     If your symptoms worsen, you develop pain in your back or stomach, develop fevers, or are not improving in 5 days, please contact your primary care provider for an appointment or visit a Walk-in or Urgent Care Center to be seen.     Thanks again for choosing us as your health care partner,     MD TESHA Hauser, let's have you schedule a lab only visit to check what the urine sample looks like before starting a medication in this case. I will place the order for you and you can schedule via [facility].   Thanks,   Dr. Martinez

## 2022-03-23 ENCOUNTER — LAB (OUTPATIENT)
Dept: LAB | Facility: CLINIC | Age: 22
End: 2022-03-23
Payer: COMMERCIAL

## 2022-03-23 DIAGNOSIS — R35.0 URINARY FREQUENCY: ICD-10-CM

## 2022-03-23 LAB
ALBUMIN UR-MCNC: NEGATIVE MG/DL
APPEARANCE UR: CLEAR
BILIRUB UR QL STRIP: NEGATIVE
COLOR UR AUTO: YELLOW
GLUCOSE UR STRIP-MCNC: NEGATIVE MG/DL
HGB UR QL STRIP: NEGATIVE
KETONES UR STRIP-MCNC: NEGATIVE MG/DL
LEUKOCYTE ESTERASE UR QL STRIP: NEGATIVE
NITRATE UR QL: NEGATIVE
PH UR STRIP: 6 [PH] (ref 5–8)
SP GR UR STRIP: 1.02 (ref 1–1.03)
UROBILINOGEN UR STRIP-ACNC: 0.2 E.U./DL

## 2022-03-23 PROCEDURE — 81003 URINALYSIS AUTO W/O SCOPE: CPT

## 2022-08-29 ENCOUNTER — E-VISIT (OUTPATIENT)
Dept: OBGYN | Facility: CLINIC | Age: 22
End: 2022-08-29
Payer: COMMERCIAL

## 2022-08-29 DIAGNOSIS — Z20.822 SUSPECTED COVID-19 VIRUS INFECTION: Primary | ICD-10-CM

## 2022-08-29 PROCEDURE — 99421 OL DIG E/M SVC 5-10 MIN: CPT | Mod: CS | Performed by: FAMILY MEDICINE

## 2022-08-29 RX ORDER — ACETAMINOPHEN 500 MG
500-1000 TABLET ORAL EVERY 4 HOURS PRN
Qty: 30 TABLET | Refills: 0 | Status: SHIPPED | OUTPATIENT
Start: 2022-08-29 | End: 2023-04-06

## 2022-08-29 RX ORDER — BENZONATATE 100 MG/1
100 CAPSULE ORAL 3 TIMES DAILY PRN
Qty: 30 CAPSULE | Refills: 0 | Status: SHIPPED | OUTPATIENT
Start: 2022-08-29 | End: 2023-04-06

## 2022-08-29 NOTE — PATIENT INSTRUCTIONS
Dear Jimena,      Based on your responses, you already have tested positive on 8/28 to coronavirus (COVID-19). You technically do not need to retest for this if your home test was already positive, especially with the other information you provided. However, if you wish to get a PCR test for any reason I have ordered that here for you. I would advise to follow the isolation procedures mentioned below, and also I can send a cough medication in case that worsens. Tylenol/ibuprofen is also fine to use.   Thanks,   Dr. Martinez    Here is more information:     Will I be tested for COVID-19?  We would like to test you for COVID. I have placed orders for this test.     To schedule: go to your [facility] home page and scroll down to the section that says  You have an appointment that needs to be scheduled  and click the large green button that says  Schedule Now  and follow the steps to find the next available openings.    If you are unable to complete these [facility] scheduling steps, please call [phone] to schedule your testing.     These guidelines are for isolating before returning to work, school or .     For employers, schools and day cares: This is an official notice for this person s medical guidelines for returning in-person.     For health care sites: The CDC gives different isolation and quarantine guidelines for healthcare sites, please check with these sites before arriving.     How do I self-isolate?  You isolate when you have symptoms of COVID or a test shows you have COVID, even if you don t have symptoms.     If you DO have symptoms:  o Stay home and away from others  - For at least 5 days after your symptoms started, AND   - You are fever free for 24 hours (with no medicine that reduces fever), AND  - Your other symptoms are better.  o Wear a mask for 10 full days any time you are around others.    If you DON T have symptoms:  o Stay at home and away from others for at least 5 days after your  positive test.  o Wear a mask for 10 full days any time you are around others.    How can I take care of myself?  Over the counter medications may help with your symptoms such as runny or stuffy nose, cough, chills, or fever.  Talk to your care team about your options.     Some people are at high risk of severe illness (for example, you have a weak immune system, you re 65 years or older, or you have certain medical problems). If your risk is high and your symptoms started in the last 5 to 7 days, we strongly recommend for you to get COVID treatment as soon as possible. Paxlovid, Molnupiravir and the monoclonal antibody treatments are proven safe and effective, make you feel better faster, and prevent hospitalization and death.       To schedule an appointment to discuss COVID treatment, request an appointment on [facility] (select  COVID-19 Treatment ) or call [facility] ([phone]), press 7.      Get lots of rest. Drink extra fluids (unless a doctor has told you not to)    Take Tylenol (acetaminophen) or ibuprofen for fever or pain. If you have liver or kidney problems, ask your family doctor if it's okay to take Tylenol or ibuprofen    Take over the counter medications for your symptoms, as directed by your doctor. You may also talk to your pharmacist.      If you have other health problems (like cancer, heart failure, an organ transplant or severe kidney disease): Call your specialty clinic if you don't feel better in the next 2 days.    Know when to call 911. Emergency warning signs include:  o Trouble breathing or shortness of breath  o Pain or pressure in the chest that doesn't go away  o Feeling confused like you haven't felt before, or not being able to wake up  o Bluish-colored lips or face    Where can I get more information?    [facility] - About COVID-19: [email or web address]     CDC - What to Do If You're Sick: https://www.cdc.gov/coronavirus/2019-ncov/if-you-are-sick/index.html      CDC - Quarantine & Isolation: https://www.cdc.gov/coronavirus/2019-ncov/your-health/quarantine-isolation.html     [facility] clinical trials (COVID-19 research studies): [email or web address]    Below are the COVID-19 hotlines at the [place] Department of Health ([facility]). Interpreters are available.  o For health questions: Call 108-214-1471 or 1-485.609.1634 (7 a.m. to 7 p.m.)  o For questions about schools and childcare: Call 360-350-9407 or 1-434.588.2286 (7 a.m. to 7 p.m.)

## 2022-08-29 NOTE — TELEPHONE ENCOUNTER
Provider E-Visit time total (minutes): 3    Dear Jimena,  Based on your responses, you already have tested positive on 8/28 to coronavirus (COVID-19). You technically do not need to retest for this if your home test was already positive, especially with the other information you provided. However, if you wish to get a PCR test for any reason I have ordered that here for you. I would advise to follow the isolation procedures mentioned below, and also I can send a cough medication in case that worsens. Tylenol/ibuprofen is also fine to use.   Thanks,   Dr. Martinez

## 2022-09-09 DIAGNOSIS — F40.10 SOCIAL ANXIETY DISORDER: ICD-10-CM

## 2022-09-09 DIAGNOSIS — F32.1 CURRENT MODERATE EPISODE OF MAJOR DEPRESSIVE DISORDER, UNSPECIFIED WHETHER RECURRENT (H): ICD-10-CM

## 2022-09-09 RX ORDER — VENLAFAXINE HYDROCHLORIDE 37.5 MG/1
37.5 CAPSULE, EXTENDED RELEASE ORAL DAILY
Qty: 90 CAPSULE | Refills: 4 | Status: SHIPPED | OUTPATIENT
Start: 2022-09-09 | End: 2023-03-20

## 2022-09-09 NOTE — TELEPHONE ENCOUNTER
Pending Prescriptions:                       Disp   Refills    venlafaxine (EFFEXOR XR) 37.5 MG 24 hr ca*90 cap*1            Sig: Take 1 capsule (37.5 mg) by mouth daily    PATIENT CALLING AND ONLY HAS ONE DAY OF MEDICATION LEFT.

## 2022-12-14 DIAGNOSIS — Z30.41 ENCOUNTER FOR SURVEILLANCE OF CONTRACEPTIVE PILLS: ICD-10-CM

## 2022-12-14 DIAGNOSIS — F32.1 CURRENT MODERATE EPISODE OF MAJOR DEPRESSIVE DISORDER, UNSPECIFIED WHETHER RECURRENT (H): ICD-10-CM

## 2022-12-14 DIAGNOSIS — F40.10 SOCIAL ANXIETY DISORDER: ICD-10-CM

## 2022-12-15 RX ORDER — NORGESTIMATE AND ETHINYL ESTRADIOL 0.25-0.035
1 KIT ORAL DAILY
Qty: 84 TABLET | Refills: 0 | Status: SHIPPED | OUTPATIENT
Start: 2022-12-15 | End: 2023-01-26

## 2022-12-15 RX ORDER — VENLAFAXINE HYDROCHLORIDE 75 MG/1
CAPSULE, EXTENDED RELEASE ORAL
Qty: 90 CAPSULE | Refills: 0 | Status: SHIPPED | OUTPATIENT
Start: 2022-12-15 | End: 2023-03-09

## 2022-12-15 RX ORDER — VENLAFAXINE HYDROCHLORIDE 37.5 MG/1
37.5 CAPSULE, EXTENDED RELEASE ORAL DAILY
Qty: 90 CAPSULE | Refills: 4 | OUTPATIENT
Start: 2022-12-15

## 2022-12-15 NOTE — TELEPHONE ENCOUNTER
"Routing refill request to provider for review/approval because:  PHQ 9 score - not on file/out of date.  PHQ9 score - greater than 4.  Patient needs to be seen because it has been more than 6 months since last office visit.    Last Written Prescription Date:  11/11/21  Last Fill Quantity: 90,  # refills: 1   Last office visit provider:  2/28/22     Requested Prescriptions   Pending Prescriptions Disp Refills     venlafaxine (EFFEXOR XR) 75 MG 24 hr capsule [Pharmacy Med Name: VENLAFAXINE HCL ER 75 MG CAP] 90 capsule 1     Sig: TAKE 1 CAPSULE BY MOUTH DAILY WITH 37.5MG CAPSULE       Serotonin-Norepinephrine Reuptake Inhibitors  Failed - 12/14/2022 11:09 AM        Failed - PHQ-9 score of less than 5 in past 6 months     Please review last PHQ-9 score.           Failed - Normal serum creatinine on file in past 12 months     No lab results found.    Ok to refill medication if creatinine is low          Failed - Recent (6 mo) or future (30 days) visit within the authorizing provider's specialty     Patient had office visit in the last 6 months or has a visit in the next 30 days with authorizing provider or within the authorizing provider's specialty.  See \"Patient Info\" tab in inbasket, or \"Choose Columns\" in Meds & Orders section of the refill encounter.            Passed - Blood pressure under 140/90 in past 12 months     BP Readings from Last 3 Encounters:   02/28/22 104/72   02/04/22 113/72   01/10/20 102/72                 Passed - Medication is active on med list        Passed - Patient is age 18 or older        Passed - No active pregnancy on record        Passed - No positive pregnancy test in past 12 months         Signed Prescriptions Disp Refills    norgestimate-ethinyl estradiol (COOPER) 0.25-35 MG-MCG tablet 84 tablet 0     Sig: Take 1 tablet by mouth daily       Contraceptives Protocol Passed - 12/15/2022  9:06 AM        Passed - Patient is not a current smoker if age is 35 or older        Passed - Recent (12 " "mo) or future (30 days) visit within the authorizing provider's specialty     Patient has had an office visit with the authorizing provider or a provider within the authorizing providers department within the previous 12 mos or has a future within next 30 days. See \"Patient Info\" tab in inbasket, or \"Choose Columns\" in Meds & Orders section of the refill encounter.              Passed - Medication is active on med list        Passed - No active pregnancy on record        Passed - No positive pregnancy test in past 12 months             Eduardo Heart RN 12/15/22 9:07 AM  "

## 2022-12-15 NOTE — TELEPHONE ENCOUNTER
"Last Written Prescription Date:  2/28/22  Last Fill Quantity: 84,  # refills: 2   Last office visit provider:  2/28/22     Requested Prescriptions   Pending Prescriptions Disp Refills     COOPER 0.25-35 MG-MCG tablet [Pharmacy Med Name: COOPER 0.25-0.035 MG TABLET] 84 tablet 1     Sig: TAKE 1 TABLET BY MOUTH EVERY DAY       Contraceptives Protocol Passed - 12/15/2022  9:06 AM        Passed - Patient is not a current smoker if age is 35 or older        Passed - Recent (12 mo) or future (30 days) visit within the authorizing provider's specialty     Patient has had an office visit with the authorizing provider or a provider within the authorizing providers department within the previous 12 mos or has a future within next 30 days. See \"Patient Info\" tab in inbasket, or \"Choose Columns\" in Meds & Orders section of the refill encounter.              Passed - Medication is active on med list        Passed - No active pregnancy on record        Passed - No positive pregnancy test in past 12 months           venlafaxine (EFFEXOR XR) 75 MG 24 hr capsule [Pharmacy Med Name: VENLAFAXINE HCL ER 75 MG CAP] 90 capsule 1     Sig: TAKE 1 CAPSULE BY MOUTH DAILY WITH 37.5MG CAPSULE       Serotonin-Norepinephrine Reuptake Inhibitors  Failed - 12/14/2022 11:09 AM        Failed - PHQ-9 score of less than 5 in past 6 months     Please review last PHQ-9 score.           Failed - Normal serum creatinine on file in past 12 months     No lab results found.    Ok to refill medication if creatinine is low          Failed - Recent (6 mo) or future (30 days) visit within the authorizing provider's specialty     Patient had office visit in the last 6 months or has a visit in the next 30 days with authorizing provider or within the authorizing provider's specialty.  See \"Patient Info\" tab in inbasket, or \"Choose Columns\" in Meds & Orders section of the refill encounter.            Passed - Blood pressure under 140/90 in past 12 months     BP Readings " from Last 3 Encounters:   02/28/22 104/72   02/04/22 113/72   01/10/20 102/72                 Passed - Medication is active on med list        Passed - Patient is age 18 or older        Passed - No active pregnancy on record        Passed - No positive pregnancy test in past 12 months             Eduardo Heart RN 12/15/22 9:07 AM

## 2023-01-26 ENCOUNTER — MYC REFILL (OUTPATIENT)
Dept: FAMILY MEDICINE | Facility: CLINIC | Age: 23
End: 2023-01-26
Payer: COMMERCIAL

## 2023-01-26 DIAGNOSIS — Z30.41 ENCOUNTER FOR SURVEILLANCE OF CONTRACEPTIVE PILLS: ICD-10-CM

## 2023-01-27 NOTE — TELEPHONE ENCOUNTER
"Due to be seen    Last Written Prescription Date:  12/15/22  Last Fill Quantity: 84,  # refills: 0   Last office visit provider:  2/28/22     Requested Prescriptions   Pending Prescriptions Disp Refills     norgestimate-ethinyl estradiol (COOPER) 0.25-35 MG-MCG tablet 84 tablet 0     Sig: Take 1 tablet by mouth daily       Contraceptives Protocol Passed - 1/26/2023  7:27 AM        Passed - Patient is not a current smoker if age is 35 or older        Passed - Recent (12 mo) or future (30 days) visit within the authorizing provider's specialty     Patient has had an office visit with the authorizing provider or a provider within the authorizing providers department within the previous 12 mos or has a future within next 30 days. See \"Patient Info\" tab in inbasket, or \"Choose Columns\" in Meds & Orders section of the refill encounter.              Passed - Medication is active on med list        Passed - No active pregnancy on record        Passed - No positive pregnancy test in past 12 months             [place], [place], RN 01/26/23 6:30 PM  "

## 2023-01-28 RX ORDER — NORGESTIMATE AND ETHINYL ESTRADIOL 0.25-0.035
1 KIT ORAL DAILY
Qty: 84 TABLET | Refills: 0 | Status: SHIPPED | OUTPATIENT
Start: 2023-01-28 | End: 2023-03-20

## 2023-03-08 DIAGNOSIS — F40.10 SOCIAL ANXIETY DISORDER: ICD-10-CM

## 2023-03-08 DIAGNOSIS — F32.1 CURRENT MODERATE EPISODE OF MAJOR DEPRESSIVE DISORDER, UNSPECIFIED WHETHER RECURRENT (H): ICD-10-CM

## 2023-03-09 RX ORDER — VENLAFAXINE HYDROCHLORIDE 75 MG/1
CAPSULE, EXTENDED RELEASE ORAL
Qty: 90 CAPSULE | Refills: 0 | Status: SHIPPED | OUTPATIENT
Start: 2023-03-09 | End: 2023-03-20

## 2023-03-09 NOTE — TELEPHONE ENCOUNTER
"Routing refill request to provider for review/approval because:  Labs not current:  Serum creatinine  Blood pressures not current  PHQ-9 score  Patient needs to be seen    Last Written Prescription Date:  12/15/2022  Last Fill Quantity: 90,  # refills: 0   Last office visit provider:  2/28/2022     Requested Prescriptions   Pending Prescriptions Disp Refills     venlafaxine (EFFEXOR XR) 75 MG 24 hr capsule [Pharmacy Med Name: VENLAFAXINE HCL ER 75 MG CAP] 90 capsule 0     Sig: TAKE 1 CAPSULE BY MOUTH DAILY WITH 37.5MG CAPSULE       Serotonin-Norepinephrine Reuptake Inhibitors  Failed - 3/8/2023 12:32 PM        Failed - Blood pressure under 140/90 in past 12 months     BP Readings from Last 3 Encounters:   02/28/22 104/72   02/04/22 113/72   01/10/20 102/72                 Failed - PHQ-9 score of less than 5 in past 6 months     Please review last PHQ-9 score.           Failed - Normal serum creatinine on file in past 12 months     No lab results found.    Ok to refill medication if creatinine is low          Failed - Recent (6 mo) or future (30 days) visit within the authorizing provider's specialty     Patient had office visit in the last 6 months or has a visit in the next 30 days with authorizing provider or within the authorizing provider's specialty.  See \"Patient Info\" tab in inbasket, or \"Choose Columns\" in Meds & Orders section of the refill encounter.            Passed - Medication is active on med list        Passed - Patient is age 18 or older        Passed - No active pregnancy on record        Passed - No positive pregnancy test in past 12 months             Breana Crook RN 03/09/23 5:11 PM  "

## 2023-03-14 ENCOUNTER — MYC REFILL (OUTPATIENT)
Dept: FAMILY MEDICINE | Facility: CLINIC | Age: 23
End: 2023-03-14
Payer: COMMERCIAL

## 2023-03-14 DIAGNOSIS — F32.1 CURRENT MODERATE EPISODE OF MAJOR DEPRESSIVE DISORDER, UNSPECIFIED WHETHER RECURRENT (H): ICD-10-CM

## 2023-03-14 DIAGNOSIS — F40.10 SOCIAL ANXIETY DISORDER: ICD-10-CM

## 2023-03-15 RX ORDER — VENLAFAXINE HYDROCHLORIDE 75 MG/1
CAPSULE, EXTENDED RELEASE ORAL
Qty: 90 CAPSULE | Refills: 0 | OUTPATIENT
Start: 2023-03-15

## 2023-03-15 NOTE — TELEPHONE ENCOUNTER
"Routing refill request to provider for review/approval because:  Too early for refill and has not seen provider since 2/28/2022    [place] Written Prescription Date:  3/9/2023  Last Fill Quantity: 90,  # refills: 0   Last office visit provider:  2/28/2022     Requested Prescriptions   Pending Prescriptions Disp Refills     venlafaxine (EFFEXOR XR) 75 MG 24 hr capsule 90 capsule 0       Serotonin-Norepinephrine Reuptake Inhibitors  Failed - 3/15/2023  6:19 AM        Failed - Blood pressure under 140/90 in past 12 months     BP Readings from Last 3 Encounters:   02/28/22 104/72   02/04/22 113/72   01/10/20 102/72                 Failed - PHQ-9 score of less than 5 in past 6 months     Please review last PHQ-9 score.           Failed - Normal serum creatinine on file in past 12 months     No lab results found.    Ok to refill medication if creatinine is low          Failed - Recent (6 mo) or future (30 days) visit within the authorizing provider's specialty     Patient had office visit in the last 6 months or has a visit in the next 30 days with authorizing provider or within the authorizing provider's specialty.  See \"Patient Info\" tab in inbasket, or \"Choose Columns\" in Meds & Orders section of the refill encounter.            Passed - Medication is active on med list        Passed - Patient is age 18 or older        Passed - No active pregnancy on record        Passed - No positive pregnancy test in past 12 months             Arin Huerta RN 03/15/23 6:22 AM  "

## 2023-03-20 DIAGNOSIS — F40.10 SOCIAL ANXIETY DISORDER: ICD-10-CM

## 2023-03-20 DIAGNOSIS — F32.1 CURRENT MODERATE EPISODE OF MAJOR DEPRESSIVE DISORDER, UNSPECIFIED WHETHER RECURRENT (H): ICD-10-CM

## 2023-03-20 NOTE — TELEPHONE ENCOUNTER
"Routing refill request to provider for review/approval because:  Labs not current:  Creatinine  PHQ9  BP  Early refill request    venlafaxine (EFFEXOR XR)  Last Written Prescription Date:  3/9/23, 3/20/23  Last Fill Quantity: 90,  # refills: 0   Last office visit provider:   2/28/22    Requested Prescriptions   Pending Prescriptions Disp Refills     venlafaxine (EFFEXOR XR) 37.5 MG 24 hr capsule [Pharmacy Med Name: VENLAFAXINE ER 37.5MG  CAPSULES] 90 capsule 4     Sig: TAKE 1 CAPSULE(37.5 MG) BY MOUTH DAILY       Serotonin-Norepinephrine Reuptake Inhibitors  Failed - 3/20/2023  2:35 PM        Failed - Blood pressure under 140/90 in past 12 months     BP Readings from Last 3 Encounters:   02/28/22 104/72   02/04/22 113/72   01/10/20 102/72                 Failed - PHQ-9 score of less than 5 in past 6 months     Please review last PHQ-9 score.           Failed - Normal serum creatinine on file in past 12 months     No lab results found.    Ok to refill medication if creatinine is low          Passed - Medication is active on med list        Passed - Patient is age 18 or older        Passed - No active pregnancy on record        Passed - No positive pregnancy test in past 12 months        Passed - Recent (6 mo) or future (30 days) visit within the authorizing provider's specialty     Patient had office visit in the last 6 months or has a visit in the next 30 days with authorizing provider or within the authorizing provider's specialty.  See \"Patient Info\" tab in inbasket, or \"Choose Columns\" in Meds & Orders section of the refill encounter.               venlafaxine (EFFEXOR XR) 75 MG 24 hr capsule [Pharmacy Med Name: VENLAFAXINE ER 75MG CAPSULES] 90 capsule 0     Sig: TAKE 1 CAPSULE(75 MG) BY MOUTH DAILY       Serotonin-Norepinephrine Reuptake Inhibitors  Failed - 3/20/2023  2:35 PM        Failed - Blood pressure under 140/90 in past 12 months     BP Readings from Last 3 Encounters:   02/28/22 104/72   02/04/22 113/72 " "  01/10/20 102/72                 Failed - PHQ-9 score of less than 5 in past 6 months     Please review last PHQ-9 score.           Failed - Normal serum creatinine on file in past 12 months     No lab results found.    Ok to refill medication if creatinine is low          Passed - Medication is active on med list        Passed - Patient is age 18 or older        Passed - No active pregnancy on record        Passed - No positive pregnancy test in past 12 months        Passed - Recent (6 mo) or future (30 days) visit within the authorizing provider's specialty     Patient had office visit in the last 6 months or has a visit in the next 30 days with authorizing provider or within the authorizing provider's specialty.  See \"Patient Info\" tab in inbasket, or \"Choose Columns\" in Meds & Orders section of the refill encounter.                 JABIER VILLAR RN 03/20/23 2:35 PM  "

## 2023-03-21 RX ORDER — VENLAFAXINE HYDROCHLORIDE 37.5 MG/1
CAPSULE, EXTENDED RELEASE ORAL
Qty: 90 CAPSULE | Refills: 3 | Status: SHIPPED | OUTPATIENT
Start: 2023-03-21 | End: 2024-01-08

## 2023-03-21 RX ORDER — VENLAFAXINE HYDROCHLORIDE 75 MG/1
CAPSULE, EXTENDED RELEASE ORAL
Qty: 90 CAPSULE | Refills: 3 | Status: SHIPPED | OUTPATIENT
Start: 2023-03-21 | End: 2024-01-08

## 2023-04-06 ENCOUNTER — VIRTUAL VISIT (OUTPATIENT)
Dept: FAMILY MEDICINE | Facility: CLINIC | Age: 23
End: 2023-04-06
Payer: COMMERCIAL

## 2023-04-06 DIAGNOSIS — F32.1 CURRENT MODERATE EPISODE OF MAJOR DEPRESSIVE DISORDER, UNSPECIFIED WHETHER RECURRENT (H): Primary | ICD-10-CM

## 2023-04-06 DIAGNOSIS — F90.8 ATTENTION DEFICIT HYPERACTIVITY DISORDER (ADHD), OTHER TYPE: ICD-10-CM

## 2023-04-06 DIAGNOSIS — F40.10 SOCIAL ANXIETY DISORDER: ICD-10-CM

## 2023-04-06 PROCEDURE — 99213 OFFICE O/P EST LOW 20 MIN: CPT | Mod: VID | Performed by: FAMILY MEDICINE

## 2023-04-06 NOTE — PATIENT INSTRUCTIONS
Option for Venlafaxine taper (when feeling ready)    Current: Venlafaxine 75mg+37.5mg  Then decrease to Venlafaxine 75mg (just hold off on taking the 37.5mg capsules). Do this for about 2-4 weeks.   Then decrease to Venlafaxine 37.5mg for about 2-4 weeks; can then space to every other day for a couple weeks if any discontinuation/withdrawal side effects are notable for you.

## 2023-04-06 NOTE — PROGRESS NOTES
Jimena is a 22 year old who is being evaluated via a billable video visit.      How would you like to obtain your AVS? MyChart  If the video visit is dropped, the invitation should be resent by: Send to e-mail at: [email or web address]  Will anyone else be joining your video visit? No          Assessment & Plan     Current moderate episode of major depressive disorder, unspecified whether recurrent (H)  Social anxiety disorder  Doing well on current dose of Venlafaxine 75mg+37.5mg daily. Reviewed option for taper plan per AVS this summer if she desires. Will see back in the fall for annual exam.     Attention deficit hyperactivity disorder (ADHD), other type, dx 4/12/18  No longer receiving care at [facility]; has found benefit with OTC supplement in sahra of other stimulants which caused her side effects.    Stable on her birth control pill.     Return in about 6 months (around 10/6/2023) for Annual physical.        Breana Martinez MD  [facility]   Jimena is a 22 year old, presenting for the following health issues:  Recheck Medication (Follow up for venlafaxine, seems to be going well)        4/6/2023     8:12 AM   Additional Questions   Roomed by Bettye DALEY LPN     HPI       Depression: doing well at this time. Working at [employer] Full time. Going to move in with her boyfriend.   She had trouble getting her meds from [facility] recently so had some discontinuation side effects. Would like me to take back over Rx for venlafaxine- stable 75mg + 37.5mg total daily.  Feels good now and eventually would like to consider to lower dose maybe this summer.    ADHD: Dexmethylphenidate is no longer used. She noticed it wasn't  Helping her focus on the right things and it exasperated her OCD- worsened skin picking and didn't feel it was helping her.  She is taking Neuriva supplement which she finds helps her focus better     Thinks she has gained a few pounds;  feels she is at a healthy weight.  Wt Readings from Last 3 Encounters:   02/28/22 49.5 kg (109 lb 1.6 oz)   02/04/22 49 kg (108 lb)   01/10/20 46.7 kg (102 lb 14.4 oz)         She is on an OCP for PMDD and this has helped significantly. NO longer noticing as significant of mood decrease those few days before her cycles.             Review of Systems         Objective           Vitals:  No vitals were obtained today due to virtual visit.    Physical Exam   GENERAL: Healthy, alert and no distress  EYES: Eyes grossly normal to inspection.  No discharge or erythema, or obvious scleral/conjunctival abnormalities.  RESP: No audible wheeze, cough, or visible cyanosis.  No visible retractions or increased work of breathing.    SKIN: Visible skin clear. No significant rash, abnormal pigmentation or lesions.  NEURO: Cranial nerves grossly intact.  Mentation and speech appropriate for age.  PSYCH: Mentation appears normal, affect normal/bright, judgement and insight intact, normal speech and appearance well-groomed.                Video-Visit Details    Type of service:  Video Visit     Originating Location (pt. Location): Home    Distant Location (provider location):  On-site  Platform used for Video Visit: Yu

## 2023-04-22 ENCOUNTER — HEALTH MAINTENANCE LETTER (OUTPATIENT)
Age: 23
End: 2023-04-22

## 2023-07-12 DIAGNOSIS — Z30.41 ENCOUNTER FOR SURVEILLANCE OF CONTRACEPTIVE PILLS: ICD-10-CM

## 2023-07-12 RX ORDER — NORGESTIMATE AND ETHINYL ESTRADIOL 0.25-0.035
KIT ORAL
Qty: 84 TABLET | Refills: 0 | Status: SHIPPED | OUTPATIENT
Start: 2023-07-12 | End: 2023-09-13

## 2023-07-12 NOTE — TELEPHONE ENCOUNTER
"Last Written Prescription Date:  3/20/2023  Last Fill Quantity: 84,  # refills: 0   Last office visit provider:  4/6/2023 with PCP    Requested Prescriptions   Pending Prescriptions Disp Refills     ESTARYLLA 0.25-35 MG-MCG tablet [Pharmacy Med Name: ESTARYLLA TABLETS 28S] 84 tablet 0     Sig: TAKE 1 TABLET BY MOUTH EVERY DAY       Contraceptives Protocol Failed - 7/12/2023 12:32 AM        Failed - Recent (12 mo) or future (30 days) visit within the authorizing provider's specialty     Patient has had an office visit with the authorizing provider or a provider within the authorizing providers department within the previous 12 mos or has a future within next 30 days. See \"Patient Info\" tab in inbasket, or \"Choose Columns\" in Meds & Orders section of the refill encounter.              Passed - Patient is not a current smoker if age is 35 or older        Passed - Medication is active on med list        Passed - No active pregnancy on record        Passed - No positive pregnancy test in past 12 months             Elvia Wang RN 07/12/23 10:16 AM  "

## 2023-09-13 ENCOUNTER — MYC REFILL (OUTPATIENT)
Dept: PEDIATRICS | Facility: CLINIC | Age: 23
End: 2023-09-13
Payer: COMMERCIAL

## 2023-09-13 DIAGNOSIS — Z30.41 ENCOUNTER FOR SURVEILLANCE OF CONTRACEPTIVE PILLS: ICD-10-CM

## 2023-09-13 RX ORDER — NORGESTIMATE AND ETHINYL ESTRADIOL 0.25-0.035
1 KIT ORAL DAILY
Qty: 84 TABLET | Refills: 1 | Status: SHIPPED | OUTPATIENT
Start: 2023-09-13 | End: 2024-01-08

## 2023-12-12 ENCOUNTER — MYC REFILL (OUTPATIENT)
Dept: FAMILY MEDICINE | Facility: CLINIC | Age: 23
End: 2023-12-12
Payer: COMMERCIAL

## 2023-12-12 DIAGNOSIS — F40.10 SOCIAL ANXIETY DISORDER: ICD-10-CM

## 2023-12-12 DIAGNOSIS — F32.1 CURRENT MODERATE EPISODE OF MAJOR DEPRESSIVE DISORDER, UNSPECIFIED WHETHER RECURRENT (H): ICD-10-CM

## 2023-12-12 RX ORDER — VENLAFAXINE HYDROCHLORIDE 75 MG/1
75 CAPSULE, EXTENDED RELEASE ORAL DAILY
Qty: 90 CAPSULE | Refills: 3 | OUTPATIENT
Start: 2023-12-12

## 2024-01-08 ENCOUNTER — OFFICE VISIT (OUTPATIENT)
Dept: FAMILY MEDICINE | Facility: CLINIC | Age: 24
End: 2024-01-08
Attending: FAMILY MEDICINE
Payer: COMMERCIAL

## 2024-01-08 VITALS
HEIGHT: 62 IN | DIASTOLIC BLOOD PRESSURE: 70 MMHG | WEIGHT: 129.9 LBS | OXYGEN SATURATION: 98 % | BODY MASS INDEX: 23.9 KG/M2 | SYSTOLIC BLOOD PRESSURE: 100 MMHG | HEART RATE: 75 BPM

## 2024-01-08 DIAGNOSIS — F32.1 CURRENT MODERATE EPISODE OF MAJOR DEPRESSIVE DISORDER, UNSPECIFIED WHETHER RECURRENT (H): ICD-10-CM

## 2024-01-08 DIAGNOSIS — D64.9 ANEMIA, UNSPECIFIED TYPE: ICD-10-CM

## 2024-01-08 DIAGNOSIS — Z00.00 ROUTINE GENERAL MEDICAL EXAMINATION AT A HEALTH CARE FACILITY: Primary | ICD-10-CM

## 2024-01-08 DIAGNOSIS — F90.8 ATTENTION DEFICIT HYPERACTIVITY DISORDER (ADHD), OTHER TYPE: ICD-10-CM

## 2024-01-08 DIAGNOSIS — Z11.3 SCREENING FOR STDS (SEXUALLY TRANSMITTED DISEASES): ICD-10-CM

## 2024-01-08 DIAGNOSIS — F40.10 SOCIAL ANXIETY DISORDER: ICD-10-CM

## 2024-01-08 DIAGNOSIS — Z30.41 ENCOUNTER FOR SURVEILLANCE OF CONTRACEPTIVE PILLS: ICD-10-CM

## 2024-01-08 LAB
HBA1C MFR BLD: 4.9 % (ref 0–5.6)
HGB BLD-MCNC: 12.8 G/DL (ref 11.7–15.7)

## 2024-01-08 PROCEDURE — 99395 PREV VISIT EST AGE 18-39: CPT | Performed by: FAMILY MEDICINE

## 2024-01-08 PROCEDURE — 83036 HEMOGLOBIN GLYCOSYLATED A1C: CPT | Performed by: FAMILY MEDICINE

## 2024-01-08 PROCEDURE — 85018 HEMOGLOBIN: CPT | Performed by: FAMILY MEDICINE

## 2024-01-08 PROCEDURE — 87491 CHLMYD TRACH DNA AMP PROBE: CPT | Performed by: FAMILY MEDICINE

## 2024-01-08 PROCEDURE — 80061 LIPID PANEL: CPT | Performed by: FAMILY MEDICINE

## 2024-01-08 PROCEDURE — 36415 COLL VENOUS BLD VENIPUNCTURE: CPT | Performed by: FAMILY MEDICINE

## 2024-01-08 PROCEDURE — 99214 OFFICE O/P EST MOD 30 MIN: CPT | Mod: 25 | Performed by: FAMILY MEDICINE

## 2024-01-08 PROCEDURE — 82728 ASSAY OF FERRITIN: CPT | Performed by: FAMILY MEDICINE

## 2024-01-08 PROCEDURE — 87591 N.GONORRHOEAE DNA AMP PROB: CPT | Performed by: FAMILY MEDICINE

## 2024-01-08 RX ORDER — NORGESTIMATE AND ETHINYL ESTRADIOL 0.25-0.035
1 KIT ORAL DAILY
Qty: 84 TABLET | Refills: 3 | Status: SHIPPED | OUTPATIENT
Start: 2024-01-08 | End: 2024-08-30

## 2024-01-08 RX ORDER — VENLAFAXINE HYDROCHLORIDE 75 MG/1
75 CAPSULE, EXTENDED RELEASE ORAL DAILY
Qty: 90 CAPSULE | Refills: 3 | Status: SHIPPED | OUTPATIENT
Start: 2024-01-08 | End: 2024-03-13

## 2024-01-08 ASSESSMENT — ENCOUNTER SYMPTOMS
NAUSEA: 0
EYE PAIN: 0
WEAKNESS: 0
COUGH: 0
SHORTNESS OF BREATH: 0
PALPITATIONS: 0
DIARRHEA: 0
BREAST MASS: 0
DIZZINESS: 0
FREQUENCY: 0
HEMATURIA: 0
HEMATOCHEZIA: 0
PARESTHESIAS: 0
MYALGIAS: 0
DYSURIA: 0
JOINT SWELLING: 0
ABDOMINAL PAIN: 0
CONSTIPATION: 0
HEARTBURN: 0
NERVOUS/ANXIOUS: 1
HEADACHES: 0
CHILLS: 0
FEVER: 0
SORE THROAT: 0
ARTHRALGIAS: 1

## 2024-01-08 ASSESSMENT — PATIENT HEALTH QUESTIONNAIRE - PHQ9
SUM OF ALL RESPONSES TO PHQ QUESTIONS 1-9: 14
SUM OF ALL RESPONSES TO PHQ QUESTIONS 1-9: 14
10. IF YOU CHECKED OFF ANY PROBLEMS, HOW DIFFICULT HAVE THESE PROBLEMS MADE IT FOR YOU TO DO YOUR WORK, TAKE CARE OF THINGS AT HOME, OR GET ALONG WITH OTHER PEOPLE: SOMEWHAT DIFFICULT

## 2024-01-08 NOTE — PROGRESS NOTES
SUBJECTIVE:   Nadia is a 23 year old, presenting for the following:  Physical (Not fasting) and Menstrual Problem (Having trouble with mood little bit before and during period, also having hip pain during menses)        1/8/2024     3:40 PM   Additional Questions   Roomed by Bettye DALEY LPN       Healthy Habits:     Getting at least 3 servings of Calcium per day:  Yes    Bi-annual eye exam:  Yes    Dental care twice a year:  Yes    Sleep apnea or symptoms of sleep apnea:  Daytime drowsiness    Diet:  Regular (no restrictions)    Frequency of exercise:  2-3 days/week    Duration of exercise:  30-45 minutes    Taking medications regularly:  Yes    Medication side effects:  Other    Additional concerns today:  Yes    Chief Complaint   Patient presents with    Physical     Not fasting    Menstrual Problem     Having trouble with mood little bit before and during period, also having hip pain during menses     Recently mood has been lower- not using her happy light. Talk therapy in the past was helpful but hasn't been doing this lately. Self doubt as her biggest area of concerns    She finds a supplement Noreva has been very helpful for her ADHD. Declines being on medication overall for that management (had been on Focalin in the past during college; suspected it was too high of a dose).    PMDD: On an OCP for birth control which helps overall.   Noticing hip pain in the joints or anterior groin/low pelvic region during the heavy flow menstrual flow portion of her cycle. Using a menstrual cup which helps (can last 8hr with heavy flow days)  She hasn't tried tylenol yet; found CBD gummies helpful actually    Pap UTD from 2/2022    Social History     Social History Narrative    Working at a [employer] for [employer]. Living with her boyfriend Curt.    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     She reports she barely graduated high school due to late dx of ADHD by her  report.    Breana Martinez MD         Today's PHQ-9 Score:       1/8/2024     8:09 AM   PHQ-9 SCORE   PHQ-9 Total Score MyChart 14 (Moderate depression)   PHQ-9 Total Score 14       Social History     Tobacco Use    Smoking status: Never    Smokeless tobacco: Never   Substance Use Topics    Alcohol use: Yes     Comment: Alcoholic Drinks/day: occasional drink with friends, no binge drinking         1/8/2024     8:13 AM   Alcohol Use   Prescreen: >3 drinks/day or >7 drinks/week? No          No data to display              Reviewed orders with patient.  Reviewed health maintenance and updated orders accordingly - Yes    Breast Cancer Screening:        History of abnormal Pap smear: NO - age 30- 65 PAP every 3 years recommended      2/28/2022     4:38 PM   PAP / HPV   PAP Negative for Intraepithelial Lesion or Malignancy (NILM)      Reviewed and updated as needed this visit by clinical staff   Tobacco  Allergies  Meds              Reviewed and updated as needed this visit by Provider                   Review of Systems   Constitutional:  Negative for chills and fever.   HENT:  Negative for congestion, ear pain, hearing loss and sore throat.    Eyes:  Negative for pain and visual disturbance.   Respiratory:  Negative for cough and shortness of breath.    Cardiovascular:  Negative for chest pain, palpitations and peripheral edema.   Gastrointestinal:  Negative for abdominal pain, constipation, diarrhea, heartburn, hematochezia and nausea.   Breasts:  Negative for tenderness, breast mass and discharge.   Genitourinary:  Negative for dysuria, frequency, genital sores, hematuria, pelvic pain, urgency, vaginal bleeding and vaginal discharge.   Musculoskeletal:  Positive for arthralgias. Negative for joint swelling and myalgias.   Skin:  Negative for rash.   Neurological:  Negative for dizziness, weakness, headaches and paresthesias.   Psychiatric/Behavioral:  Negative for mood changes. The patient is nervous/anxious.   "       OBJECTIVE:   /70 (BP Location: Left arm, Patient Position: Sitting, Cuff Size: Adult Regular)   Pulse 75   Ht 1.579 m (5' 2.17\")   Wt 58.9 kg (129 lb 14.4 oz)   LMP 12/28/2023   SpO2 98%   BMI 23.63 kg/m    Physical Exam  GENERAL: healthy, alert and no distress  EYES: Eyes grossly normal to inspection, PERRL and conjunctivae and sclerae normal  HENT: ear canals and TM's normal, nose and mouth without ulcers or lesions  NECK: no adenopathy, no asymmetry, masses, or scars and thyroid normal to palpation  RESP: lungs clear to auscultation - no rales, rhonchi or wheezes  BREAST: declines  CV: regular rate and rhythm, normal S1 S2, no S3 or S4, no murmur, click or rub, no peripheral edema and peripheral pulses strong  ABDOMEN: soft, nontender, no hepatosplenomegaly, no masses and bowel sounds normal  MS: no gross musculoskeletal defects noted, no edema  SKIN: no suspicious lesions or rashes  NEURO: Normal strength and tone, mentation intact and speech normal  PSYCH: mentation appears normal, affect normal/bright        ASSESSMENT/PLAN:   Nadia was seen today for physical and menstrual problem.    Diagnoses and all orders for this visit:    Routine general medical examination at a health care facility  Overall doing well  -     Hemoglobin A1c  -     Hemoglobin  -     Lipid panel reflex to direct LDL Non-fasting  -     Ferritin    Encounter for surveillance of contraceptive pills  Reviewed range of contraceptive options.  - She elects to continue her current form of birth control: oral contraceptives (estrogen/progesterone).   - Good candidate for this, with no h/o liver or clotting or kidney issues. No migraines w/ aura. Nonsmoker.   - Side effect profile reviewed including effects on spotting, nausea/ headaches, and emotional lability and risk for DVT/VTE.     - Understands annual follow up for med check or sooner if concerns arise      -     norgestimate-ethinyl estradiol (ESTARYLLA) 0.25-35 MG-MCG " tablet; Take 1 tablet by mouth daily    Current moderate episode of major depressive disorder, unspecified whether recurrent (H)  Social anxiety disorder  Overall doing fairly well, we reviewed some various changes recently but she would like to continue current medication prescription and instead focus on counseling which I did congratulate her for that insight as its primary and first-line recommendation especially for the areas which she hopes to improve from her mindset standpoint  -     venlafaxine (EFFEXOR XR) 75 MG 24 hr capsule; Take 1 capsule (75 mg) by mouth daily  -     Adult Mental Health  Referral; Future    Attention deficit hyperactivity disorder (ADHD), other type, dx 4/12/18  Managing with coping skills, diet, exercise, and dietary supplement no refill.  Declines need for being on medication at this point  -     Adult Mental Health  Referral; Future    Screening for STDs (sexually transmitted diseases)  -     Chlamydia trachomatis/Neisseria gonorrhoeae by PCR- VAGINAL SELF-SWAB    Anemia, unspecified type  Given hx anemia she has been taking iron supplement and Hb returned 12.8 with ferritin 202; will encourage to slightly reduce that dosing   -     Ferritin    Other orders  -     PRIMARY CARE FOLLOW-UP SCHEDULING  -     REVIEW OF HEALTH MAINTENANCE PROTOCOL ORDERS  -     PRIMARY CARE FOLLOW-UP SCHEDULING; Future        Patient has been advised of split billing requirements and indicates understanding: Yes      COUNSELING:  Reviewed preventive health counseling, as reflected in patient instructions        She reports that she has never smoked. She has never used smokeless tobacco.        Breana Martinez MD  [facility]  Answers submitted by the patient for this visit:  Patient Health Questionnaire (Submitted on 1/8/2024)  If you checked off any problems, how difficult have these problems made it for you to do your work, take care of things at  home, or get along with other people?: Somewhat difficult  PHQ9 TOTAL SCORE: 14

## 2024-01-09 LAB
C TRACH DNA SPEC QL PROBE+SIG AMP: NEGATIVE
CHOLEST SERPL-MCNC: 154 MG/DL
FASTING STATUS PATIENT QL REPORTED: NORMAL
FERRITIN SERPL-MCNC: 202 NG/ML (ref 6–175)
HDLC SERPL-MCNC: 65 MG/DL
LDLC SERPL CALC-MCNC: 77 MG/DL
N GONORRHOEA DNA SPEC QL NAA+PROBE: NEGATIVE
NONHDLC SERPL-MCNC: 89 MG/DL
TRIGL SERPL-MCNC: 61 MG/DL

## 2024-01-10 NOTE — PATIENT INSTRUCTIONS
"MINDFULNESS    \"Mindfulness is about being fully awake in our lives. It is about perceiving the exquisite vividness of each moment.\"      - Scout Stanton  Mindfulness-Based Stress Reduction (MBSR) is a blend of meditation, body awareness, and yoga:   learning through practice and study how your body handles (and can resolve) stress neurologically.     Mindfulness Resources (all are free):  \"Calm\" lucila- free trial has guided 10min sessions on anxiety, gratitude, sleep, happiness, managing stress, etc.   \"Smiling Minds\" lucila- short guided sessions for children and adults  \"Curable Pain Relief\" lucila- for chronic pain  \"Insight Timer\" lucila- free meditation timer with musical or bell tones, can also stream guided meditations    Free 8 week MBSR program online: https://SeeMe/      "

## 2024-03-13 ENCOUNTER — MYC REFILL (OUTPATIENT)
Dept: FAMILY MEDICINE | Facility: CLINIC | Age: 24
End: 2024-03-13
Payer: COMMERCIAL

## 2024-03-13 DIAGNOSIS — F40.10 SOCIAL ANXIETY DISORDER: ICD-10-CM

## 2024-03-13 DIAGNOSIS — F32.1 CURRENT MODERATE EPISODE OF MAJOR DEPRESSIVE DISORDER, UNSPECIFIED WHETHER RECURRENT (H): ICD-10-CM

## 2024-03-13 RX ORDER — VENLAFAXINE HYDROCHLORIDE 75 MG/1
75 CAPSULE, EXTENDED RELEASE ORAL DAILY
Qty: 90 CAPSULE | Refills: 3 | Status: SHIPPED | OUTPATIENT
Start: 2024-03-13

## 2024-08-30 ENCOUNTER — MYC REFILL (OUTPATIENT)
Dept: FAMILY MEDICINE | Facility: CLINIC | Age: 24
End: 2024-08-30
Payer: COMMERCIAL

## 2024-08-30 DIAGNOSIS — Z30.41 ENCOUNTER FOR SURVEILLANCE OF CONTRACEPTIVE PILLS: ICD-10-CM

## 2024-08-30 RX ORDER — NORGESTIMATE AND ETHINYL ESTRADIOL 0.25-0.035
1 KIT ORAL DAILY
Qty: 84 TABLET | Refills: 1 | Status: SHIPPED | OUTPATIENT
Start: 2024-08-30

## 2024-08-30 NOTE — TELEPHONE ENCOUNTER
08/30/24  Attempted to call pt. No answer and VM full so no message left. Encourage pt to schedule annual exam as Dr. Martinez books out. Pt's last annual was 01/08/24.  Bindu

## 2024-09-13 ENCOUNTER — MYC REFILL (OUTPATIENT)
Dept: FAMILY MEDICINE | Facility: CLINIC | Age: 24
End: 2024-09-13
Payer: COMMERCIAL

## 2024-09-13 DIAGNOSIS — F32.1 CURRENT MODERATE EPISODE OF MAJOR DEPRESSIVE DISORDER, UNSPECIFIED WHETHER RECURRENT (H): ICD-10-CM

## 2024-09-13 DIAGNOSIS — F40.10 SOCIAL ANXIETY DISORDER: ICD-10-CM

## 2024-09-13 RX ORDER — VENLAFAXINE HYDROCHLORIDE 75 MG/1
75 CAPSULE, EXTENDED RELEASE ORAL DAILY
Qty: 90 CAPSULE | Refills: 3 | Status: CANCELLED | OUTPATIENT
Start: 2024-09-13

## 2024-11-11 ENCOUNTER — MYC MEDICAL ADVICE (OUTPATIENT)
Dept: FAMILY MEDICINE | Facility: CLINIC | Age: 24
End: 2024-11-11
Payer: COMMERCIAL

## 2024-11-12 NOTE — TELEPHONE ENCOUNTER
Please encourage video visit (I still had openings next week I think) as nausea is often a longer review of what might be causing that. Our last visit was back in Jan (nearly 1 year ago); so likely needs  a physical also scheduled as those book 3 months out.     She could do an evisit to request for zofran for nausea but that's not going to help solve the reason for the nausea without a conversation/video based visit which is why I recommend that if she is able.   Thanks,   Dr. Martinez

## 2024-11-12 NOTE — TELEPHONE ENCOUNTER
Call to patient and offered video visit today per PCP recommendation. Patient is working and unavailable. Will await provider response to MyChart.

## 2024-11-14 ENCOUNTER — E-VISIT (OUTPATIENT)
Dept: FAMILY MEDICINE | Facility: CLINIC | Age: 24
End: 2024-11-14
Payer: COMMERCIAL

## 2024-11-14 DIAGNOSIS — G44.209 TENSION HEADACHE: Primary | ICD-10-CM

## 2024-11-14 RX ORDER — SUMATRIPTAN SUCCINATE 25 MG/1
25 TABLET ORAL
Qty: 8 TABLET | Refills: 0 | Status: SHIPPED | OUTPATIENT
Start: 2024-11-14

## 2024-11-14 RX ORDER — ONDANSETRON 4 MG/1
4 TABLET, ORALLY DISINTEGRATING ORAL EVERY 8 HOURS PRN
Qty: 30 TABLET | Refills: 0 | Status: SHIPPED | OUTPATIENT
Start: 2024-11-14

## 2024-11-14 NOTE — PATIENT INSTRUCTIONS
Pramod Zuniga, I am sorry you are not feeling well.  We last talked with each other almost a year ago so I would recommend scheduling a video visit with me in the next few weeks so we can review it has been going on or you can see any available provider.      Your symptoms are definitely worth having a conversation about.  It sounds like maybe you are looking for medication for nausea as well as for headache management (from my review of your message and evisit request).  I can send a prescription for migraine type medication that you can take right away at the onset of the symptoms. However, without having a conversation visit which is either done by video visit or in person, it is challenging to provide as thorough of an evaluation which would be beneficial to you.  Lets have you please schedule follow-up for that, I typically have video visit openings within 1 to 2 weeks.    It looks like our team has tried to contact you to schedule appointments and the voice mail box is full- please consider emptying that if you haven't already so we can try to connect if needed.     You can schedule an appointment by clicking here in [employer], or call [phone].    Thanks,   Dr. Martinez    Headache: Care Instructions  Overview     Headaches have many possible causes. Most headaches aren't a sign of a more serious problem, and they will get better on their own. Home treatment may help you feel better faster.  The doctor has checked you carefully, but problems can develop later. If you notice any problems or new symptoms, get medical treatment right away.  Follow-up care is a key part of your treatment and safety. Be sure to make and go to all appointments, and call your doctor if you are having problems. It's also a good idea to know your test results and keep a list of the medicines you take.  How can you care for yourself at home?  Rest in a quiet, dark room until your headache is gone. Close your eyes and try to relax or go to  sleep. Don't watch TV or read.  Put a cold, moist cloth or cold pack on the painful area for 10 to 20 minutes at a time. Put a thin cloth between the cold pack and your skin.  Use a warm, moist towel or a heating pad set on low to relax tight shoulder and neck muscles.  Have someone gently massage your neck and shoulders.  Take pain medicines exactly as directed.  If the doctor gave you a prescription medicine for pain, take it as prescribed.  If you are not taking a prescription pain medicine, ask your doctor if you can take an over-the-counter medicine.  Do not ignore new symptoms that occur with a headache, such as a fever, weakness or numbness, vision changes, or confusion. These may be signs of a more serious problem.  To prevent headaches  Keep a headache diary so you can figure out what triggers your headaches. Avoiding triggers may help you prevent headaches. Record when each headache began, how long it lasted, and what the pain was like (throbbing, aching, stabbing, or dull). Write down any other symptoms you had with the headache, such as nausea, flashing lights or dark spots, or sensitivity to bright light or loud noise. Note if the headache occurred near your period. List anything that might have triggered the headache, such as certain foods (chocolate, cheese, wine) or odors, smoke, bright light, stress, or lack of sleep.  Find healthy ways to deal with stress. Headaches are most common during or right after stressful times. Take time to relax before and after you do something that has caused a headache in the past.  Try to keep your muscles relaxed by keeping good posture. Check your jaw, face, neck, and shoulder muscles for tension, and try relaxing them. When sitting at a desk, change positions often, and stretch for 30 seconds each hour.  Get plenty of sleep and exercise.  Eat regularly. Long periods without food can trigger a headache.  Limit caffeine by not drinking too much coffee, tea, or soda.  "But don't quit caffeine suddenly, because that can also give you headaches.  Reduce eyestrain from computers by blinking frequently and looking away from the computer screen every so often. Make sure you have proper eyewear and that your monitor is set up properly, about an arm's length away.  When should you call for help?   Call 911 anytime you think you may need emergency care. For example, call if:    You have signs of a stroke. These may include:  Sudden numbness, paralysis, or weakness in your face, arm, or leg, especially on only one side of your body.  Sudden vision changes.  Sudden trouble speaking.  Sudden confusion or trouble understanding simple statements.  Sudden problems with walking or balance.  A sudden, severe headache that is different from past headaches.   Call your doctor now or seek immediate medical care if:    You have a fever and a stiff neck.     You have new nausea and vomiting, or you cannot keep down food or fluids.     Your headache gets much worse.   Watch closely for changes in your health, and be sure to contact your doctor if:    Your headaches get worse, happen more often, or change in some way.     You have new symptoms.     Your life is disrupted by your headaches. For example, you often miss work, school, or other activities.     You do not get better as expected.   Where can you learn more?  Go to https://www.[employer].net/patiented  Enter M271 in the search box to learn more about \"Headache: Care Instructions.\"  Current as of: December 20, 2023  Content Version: 14.2 2024 [facility] [place].   Care instructions adapted under license by your healthcare professional. If you have questions about a medical condition or this instruction, always ask your healthcare professional. [employer] disclaims any warranty or liability for your use of this information.    "

## 2024-11-15 ENCOUNTER — TELEPHONE (OUTPATIENT)
Dept: FAMILY MEDICINE | Facility: CLINIC | Age: 24
End: 2024-11-15
Payer: COMMERCIAL

## 2024-11-15 NOTE — TELEPHONE ENCOUNTER
11-15-24  Pt called was expecting to have that virtual appt 11-15 @ 11:20, time now is 11:17, I advised typically pt has to call us prior to time we offer to accept the time we offered.  Pt was already scheduled 11-26 virtually and will keep her appt.  Pt declined my offer to see another provider  Luis

## 2024-11-15 NOTE — TELEPHONE ENCOUNTER
RN LVM for patient the PCP had last minute cancellation for virtual appt today for arrival of 11:20 AM.    Asked patient to call clinic back if patient would be able to make this appt today Friday 11/15.    LORAINE Dalton  [facility]  [phone]    [facility]   Monday  - Thursday 7 AM - 6 PM    Friday  7 AM - 5 PM     -Please call your clinic for assistance from a nurse after hours.

## 2024-11-20 ENCOUNTER — VIRTUAL VISIT (OUTPATIENT)
Dept: FAMILY MEDICINE | Facility: CLINIC | Age: 24
End: 2024-11-20
Payer: COMMERCIAL

## 2024-11-20 ENCOUNTER — MYC REFILL (OUTPATIENT)
Dept: FAMILY MEDICINE | Facility: CLINIC | Age: 24
End: 2024-11-20

## 2024-11-20 DIAGNOSIS — G44.209 TENSION HEADACHE: Primary | ICD-10-CM

## 2024-11-20 DIAGNOSIS — Z30.41 ENCOUNTER FOR SURVEILLANCE OF CONTRACEPTIVE PILLS: ICD-10-CM

## 2024-11-20 PROCEDURE — 99213 OFFICE O/P EST LOW 20 MIN: CPT | Mod: 95 | Performed by: FAMILY MEDICINE

## 2024-11-20 RX ORDER — NORGESTIMATE AND ETHINYL ESTRADIOL 0.25-0.035
1 KIT ORAL DAILY
Qty: 84 TABLET | Refills: 1 | OUTPATIENT
Start: 2024-11-20

## 2024-11-20 ASSESSMENT — ENCOUNTER SYMPTOMS: HEADACHES: 1

## 2024-11-20 NOTE — PROGRESS NOTES
Nadia is a 24 year old who is being evaluated via a billable video visit.    How would you like to obtain your AVS? MyChart  If the video visit is dropped, the invitation should be resent by: Text to cell phone: [phone]  Will anyone else be joining your video visit? No      Assessment & Plan     Tension headaches in AMs with nausea  Reviewed her trigger in this case was use of CBD/THC gummies 4-5x/week in evenings; discussed how these are metabolized in the liver using the P450 system, which can affect metabolism of other drugs- in this case I suspect her venlafaxine was metabolized more quickly and thus creating discontinuation side effects of the HA/nausea by the mornings. Alterative it could just be that her gummies caused a side effect of HA/nausea though she has tolerated these alone in the past.     She feels much better and endorses improved efficacy of her venlafaxine now by cutting down/out the gummies.    Continue this gameplan for now    Physical due in the spring (pap due late Feb)            Subjective   Nadia is a 24 year old, presenting for the following health issues:  Headache (X1 Wk, Has been taking gummies for sleep was interacting with Effexor )      11/20/2024    12:50 PM   Additional Questions   Roomed by Nancy JHA MA     Headache        She had scheduled her visit due to persistent headaches that she noticed when she woke up , along with morning nausea.     Evisit 11/14/24: I've had a dull aching headache 3-4 times a week right when I wake up. For work I wake up at 4:45am, I feel an aching pain and as if my head is swimming in sludge. It's come to the point where I have to call in frequently. Not as often but something that still repeats is nausea. Earlier this week and last I've felt queasy, this also has happened after I first wake up. It's happened about 2-3 times.       She sent evisit requesting headache & nausea meds and she did try the Imitrex once which helped; hadn't needed to  "use the zofran yet.   No visual aura with her headaches.     She has since Sat cut out the CBD gummies and the headaches/Nausea resolved completely.     She connected the dots now that in the past month in the evenings was taking CBD gummies (she states it was more THC component than CBD  though)   Used these gummies 4-5x/week at night    Feeling so much better now; less fatigue and less depression by stopping her CBD gummies in fact                        Objective    Vitals - Patient Reported  Weight (Patient Reported): 58.5 kg (129 lb)  Height (Patient Reported): 157.5 cm (5' 2\")  BMI (Based on Pt Reported Ht/Wt): 23.59        Physical Exam   GENERAL: alert and no distress  EYES: Eyes grossly normal to inspection.  No discharge or erythema, or obvious scleral/conjunctival abnormalities.  RESP: No audible wheeze, cough, or visible cyanosis.    SKIN: Visible skin clear. No significant rash, abnormal pigmentation or lesions.  NEURO: Cranial nerves grossly intact.  Mentation and speech appropriate for age.  PSYCH: Appropriate affect, tone, and pace of words          Video-Visit Details    Type of service:  Video Visit   Originating Location (pt. Location): Home    Distant Location (provider location):  On-site  Platform used for Video Visit: Yu  Signed Electronically by: Breana Martinez MD    "

## 2024-11-20 NOTE — PATIENT INSTRUCTIONS
"CBD Oil Information    CBD Oil (or Hemp oil) may be helpful - this is the pain modulating component of the cannabis or hemp plant and is considered to be a legal, non-addictive supplement. Look for a concentration of at least 250 mg/dose. [employer] tends to be the least expensive. IConsider starting the CBD oil at 2-5 drops two times per day and increasing as needed and as tolerated. On average, people use 20 drops two times per day (of [employer]). Website: https://[employer]. If you'd prefer to buy locally, look at Whole foods, the [employer], or [employer] - likely would be more expensive.     CBD oil (Hemp Oil, cannabidiol) is the non-euphoric component of the cannabis plant (the marjuana plant = CBD + THC). CBD is a supplement that can have a variety of changes on the body without the effects of euphoria or a \"high\".   Supplements are not monitored or regulated by the FDA and evidence on efficacy is limited, though continues to grow.  Benefits of CBD oil may include that in anxiety, pain, spasms, seizures.   Side effects of CBD oil include fatigue, diarrhea, change in appetite, among others.   Due to the relative recent use of CBD oil for medicinal purposes, evidence based studies are lacking especially when it comes to researching the potential drug interactions between CBD and prescription medications.   CBD oil is metabolized in the liver using the P450 system, which can affect metabolism of other drugs. According to the preliminary studies done so far, CBD can inhibit the P450 system, which may increase the blood levels of certain medications and decrease the levels of others. Increased blood levels essentially make the drug stronger than it s supposed to be, while decreased levels would affect its efficacy.  Be sure to review you medications with your doctor before considering starting CBD oil.    Some of the most common types of medications that may trigger CBD hemp oil " interactions include:  macrolides  calcium channel blockers  benzodiazepines  cyclosporine  sildenafil  anticonvulsants  antihistamines  haloperidol  antiretrovirals  some statins  many antidepressants (most are P450 inhibitors, so levels could be increased)  opioids  antipsychotics  beta blockers    Resources:     [email or web address]/[email or web address]/    Danish GD, Luis SL, Juan J S, Nakita JM. Medical cannabis - the [place] perspective. J Pain Res. 2016;9:735-744. Published 2016 Sep 30. Doi:10.2147/[id number]    Elizabeth M, Cally R, Fernandez LEDESMA. Cannabidiol as an Intervention for Addictive Behaviors: A Systematic Review of the Evidence. Subst Abuse. 2015;9:33-8. Published 2015 May 21. doi:10.4137/SART.[id number]

## 2025-01-08 SDOH — HEALTH STABILITY: PHYSICAL HEALTH: ON AVERAGE, HOW MANY DAYS PER WEEK DO YOU ENGAGE IN MODERATE TO STRENUOUS EXERCISE (LIKE A BRISK WALK)?: 2 DAYS

## 2025-01-08 SDOH — HEALTH STABILITY: PHYSICAL HEALTH: ON AVERAGE, HOW MANY MINUTES DO YOU ENGAGE IN EXERCISE AT THIS LEVEL?: 50 MIN

## 2025-01-08 ASSESSMENT — PATIENT HEALTH QUESTIONNAIRE - PHQ9
10. IF YOU CHECKED OFF ANY PROBLEMS, HOW DIFFICULT HAVE THESE PROBLEMS MADE IT FOR YOU TO DO YOUR WORK, TAKE CARE OF THINGS AT HOME, OR GET ALONG WITH OTHER PEOPLE: SOMEWHAT DIFFICULT
SUM OF ALL RESPONSES TO PHQ QUESTIONS 1-9: 12
SUM OF ALL RESPONSES TO PHQ QUESTIONS 1-9: 12

## 2025-01-08 ASSESSMENT — SOCIAL DETERMINANTS OF HEALTH (SDOH): HOW OFTEN DO YOU GET TOGETHER WITH FRIENDS OR RELATIVES?: TWICE A WEEK

## 2025-01-09 ENCOUNTER — OFFICE VISIT (OUTPATIENT)
Dept: FAMILY MEDICINE | Facility: CLINIC | Age: 25
End: 2025-01-09
Payer: COMMERCIAL

## 2025-01-09 VITALS
SYSTOLIC BLOOD PRESSURE: 100 MMHG | BODY MASS INDEX: 23.64 KG/M2 | HEART RATE: 82 BPM | RESPIRATION RATE: 16 BRPM | DIASTOLIC BLOOD PRESSURE: 64 MMHG | HEIGHT: 63 IN | WEIGHT: 133.4 LBS | OXYGEN SATURATION: 97 % | TEMPERATURE: 98.3 F

## 2025-01-09 DIAGNOSIS — Z30.41 ENCOUNTER FOR SURVEILLANCE OF CONTRACEPTIVE PILLS: ICD-10-CM

## 2025-01-09 DIAGNOSIS — Z12.4 CERVICAL CANCER SCREENING: ICD-10-CM

## 2025-01-09 DIAGNOSIS — Z00.00 ROUTINE GENERAL MEDICAL EXAMINATION AT A HEALTH CARE FACILITY: Primary | ICD-10-CM

## 2025-01-09 DIAGNOSIS — F32.1 CURRENT MODERATE EPISODE OF MAJOR DEPRESSIVE DISORDER, UNSPECIFIED WHETHER RECURRENT (H): ICD-10-CM

## 2025-01-09 DIAGNOSIS — F40.10 SOCIAL ANXIETY DISORDER: ICD-10-CM

## 2025-01-09 RX ORDER — VENLAFAXINE HYDROCHLORIDE 37.5 MG/1
37.5 CAPSULE, EXTENDED RELEASE ORAL DAILY
Qty: 30 CAPSULE | Refills: 3 | Status: SHIPPED | OUTPATIENT
Start: 2025-01-09

## 2025-01-09 RX ORDER — NORGESTIMATE AND ETHINYL ESTRADIOL 0.25-0.035
1 KIT ORAL DAILY
Qty: 84 TABLET | Refills: 3 | Status: CANCELLED | OUTPATIENT
Start: 2025-01-09

## 2025-01-09 RX ORDER — NORGESTIMATE AND ETHINYL ESTRADIOL 0.25-0.035
1 KIT ORAL DAILY
Qty: 84 TABLET | Refills: 1 | Status: SHIPPED | OUTPATIENT
Start: 2025-01-09

## 2025-01-09 ASSESSMENT — ANXIETY QUESTIONNAIRES
3. WORRYING TOO MUCH ABOUT DIFFERENT THINGS: SEVERAL DAYS
IF YOU CHECKED OFF ANY PROBLEMS ON THIS QUESTIONNAIRE, HOW DIFFICULT HAVE THESE PROBLEMS MADE IT FOR YOU TO DO YOUR WORK, TAKE CARE OF THINGS AT HOME, OR GET ALONG WITH OTHER PEOPLE: SOMEWHAT DIFFICULT
GAD7 TOTAL SCORE: 6
7. FEELING AFRAID AS IF SOMETHING AWFUL MIGHT HAPPEN: SEVERAL DAYS
GAD7 TOTAL SCORE: 6
1. FEELING NERVOUS, ANXIOUS, OR ON EDGE: SEVERAL DAYS
5. BEING SO RESTLESS THAT IT IS HARD TO SIT STILL: NOT AT ALL
2. NOT BEING ABLE TO STOP OR CONTROL WORRYING: NOT AT ALL
6. BECOMING EASILY ANNOYED OR IRRITABLE: MORE THAN HALF THE DAYS

## 2025-01-09 ASSESSMENT — PATIENT HEALTH QUESTIONNAIRE - PHQ9: 5. POOR APPETITE OR OVEREATING: SEVERAL DAYS

## 2025-01-09 NOTE — PATIENT INSTRUCTIONS
"IUD instructional videos:  For more good quality evidence based review of your options, consider watching the Dr. Kirstie Olguin youtube videos for more information - just search her name and \"contraception\" or \"IUD\" for these or start with these two videos:  3) The  Need to Know  Info About Your IUD Insertion: Before & After (Talking IUC with Dr. Olguin) ([email or web address].com)   6) IUD Insertion, Step by Step:  Will it Hurt???  (Talking IUC with Dr. Olguin) ([email or web address])     IUD post-insertion (after care) information:    Use back up protection for 7 days(condomns or abstain from intercourse) unless it was inserted during your period.    You will likely have mild bleeding/spotting for a few days - use pads not tampons  You may have mild cramping/pain for a few days -take ibuprofen or tylenol as needed.     If you develop significant pain or bleeding, fever or unusual discharge, please call clinic.    Sometimes the cramping and intermittent spotting can last for several monthsand eventually resolve.   If you have concerns though, please call our clinic.    "

## 2025-01-09 NOTE — PROGRESS NOTES
Preventive Care Visit  [facility]  Breana Martinez MD, Family Medicine  Jan 9, 2025      Assessment & Plan     Routine general medical examination at a health care facility  Overall doing well; declines labs    Cervical cancer screening  - Pap Screen Reflex to HPV if ASCUS - Recommended Age 25 - 29 Years    Encounter for surveillance of contraceptive pills  Options reviewed; she prefers to stay on OCP for now. Side effect profile reviewed. Sent her home with more information to review for IUDs  - norgestimate-ethinyl estradiol (ESTARYLLA) 0.25-35 MG-MCG tablet; Take 1 tablet by mouth daily.    Current moderate episode of major depressive disorder, unspecified whether recurrent (H)  Social anxiety disorder  Wanting to trial lower SNRI dose to see if that reduces her CBD related headaches (as we reviewed this can interact with how SNRI is metabolized/levels in her body). She is also working to reduce the potent CBD dosing she had been accidentally sent by her report (she tried to take just a small portion but we reviewed dosing was hard to trust with unverified outside testing)  - Decrease to venlafaxine (EFFEXOR XR) 37.5 MG 24 hr capsule; Take 1 capsule (37.5 mg) by mouth daily.      Follow-up in 1-2 months   Patient has been advised of split billing requirements and indicates understanding: Yes        Counseling  Appropriate preventive services were addressed with this patient via screening, questionnaire, or discussion as appropriate for fall prevention, nutrition, physical activity, Tobacco-use cessation, social engagement, weight loss and cognition.  Checklist reviewing preventive services available has been given to the patient.  Reviewed patient's diet, addressing concerns and/or questions.   She is at risk for lack of exercise and has been provided with information to increase physical activity for the benefit of her well-being.   The patient's PHQ-9 score is consistent with  moderate depression. She was provided with information regarding depression.       The longitudinal plan of care for the diagnosis(es)/condition(s) as documented were addressed during this visit. Due to the added complexity in care, I will continue to support Nadia in the subsequent management and with ongoing continuity of care.    Reji Zuniga is a 24 year old, presenting for the following:  Physical (Non- fasting - Questions about medications. Venlafaxine and birth control.)        1/9/2025    10:01 AM   Additional Questions   Roomed by Nancy JHA MA          HPI    Would  like to try a different lower dose of her anxiety/depression medication. On venlafaxine 75mg right now; not sure if it's causing some headaches or not  Has only been on sertraline in the past    Follow up from November when she was having headaches related to CBD supplement. She noticed improvement in stopping that supplement and tried a new one and got headaches again    She hasn't had the supplement in the past week but getting headaches still    She wants to learn more about an IUD in the future but for now to refill the OCP she is on- she finds benefits with the OCP for mood swings as well             Health Care Directive  Patient does not have a Health Care Directive: Discussed advance care planning with patient; information given to patient to review.      1/8/2025   General Health   How would you rate your overall physical health? (!) FAIR   Feel stress (tense, anxious, or unable to sleep) Only a little   (!) STRESS CONCERN      1/8/2025   Nutrition   Three or more servings of calcium each day? Yes   Diet: Regular (no restrictions)   How many servings of fruit and vegetables per day? (!) 0-1   How many sweetened beverages each day? 0-1         1/8/2025   Exercise   Days per week of moderate/strenous exercise 2 days   Average minutes spent exercising at this level 50 min   (!) EXERCISE CONCERN      1/8/2025   Social Factors    Frequency of gathering with friends or relatives Twice a week   Worry food won't last until get money to buy more No   Food not last or not have enough money for food? No   Do you have housing? (Housing is defined as stable permanent housing and does not include staying ouside in a car, in a tent, in an abandoned building, in an overnight shelter, or couch-surfing.) Yes   Are you worried about losing your housing? No   Lack of transportation? No   Unable to get utilities (heat,electricity)? No         1/8/2025   Dental   Dentist two times every year? Yes         1/8/2025   TB Screening   Were you born outside of the [place]? No       Today's PHQ-9 Score:       1/8/2025    10:50 PM   PHQ-9 SCORE   PHQ-9 Total Score MyChart 12 (Moderate depression)   PHQ-9 Total Score 12        Patient-reported         1/8/2025   Substance Use   Alcohol more than 3/day or more than 7/wk No   Do you use any other substances recreationally? (!) CANNABIS PRODUCTS     Social History     Tobacco Use    Smoking status: Never     Passive exposure: Never    Smokeless tobacco: Never   Vaping Use    Vaping status: Never Used   Substance Use Topics    Alcohol use: Yes     Comment: Alcoholic Drinks/day: occasional drink with friends, no binge drinking    Drug use: No                  1/8/2025   STI Screening   New sexual partner(s) since last STI/HIV test? No     History of abnormal Pap smear: No - age 21-29 PAP every 3 years recommended        2/28/2022     4:38 PM   PAP / HPV   PAP Negative for Intraepithelial Lesion or Malignancy (NILM)            1/8/2025   Contraception/Family Planning   Questions about contraception or family planning (!) YES not sure if she wants an IUD        Reviewed and updated as needed this visit by Provider                             Objective    Exam  /64 (BP Location: Left arm, Patient Position: Sitting, Cuff Size: Adult Regular)   Pulse 82   Temp 98.3  F (36.8  C) (Temporal)   Resp 16   Ht 1.588 m (5'  "2.5\")   Wt 60.5 kg (133 lb 6.4 oz)   LMP 12/09/2024 (Approximate)   SpO2 97%   BMI 24.01 kg/m     Estimated body mass index is 24.01 kg/m  as calculated from the following:    Height as of this encounter: 1.588 m (5' 2.5\").    Weight as of this encounter: 60.5 kg (133 lb 6.4 oz).    Physical Exam  GENERAL: alert and no distress  EYES: Eyes grossly normal to inspection, PERRL and conjunctivae and sclerae normal  HENT: ear canals and TM's normal, nose and mouth without ulcers or lesions  NECK: no adenopathy, no asymmetry, masses, or scars  RESP: lungs clear to auscultation - no rales, rhonchi or wheezes  CV: regular rate and rhythm, normal S1 S2, no S3 or S4, no murmur, click or rub, no peripheral edema  ABDOMEN: soft, nontender, no hepatosplenomegaly, no masses and bowel sounds normal   (female) w/bimanual: normal female external genitalia, normal urethral meatus, normal vaginal mucosa, and normal cervix/adnexa/uterus without masses or discharge  MS: no gross musculoskeletal defects noted, no edema  SKIN: no suspicious lesions or rashes  NEURO: Normal strength and tone, mentation intact and speech normal  PSYCH: mentation appears normal, affect normal/bright        Signed Electronically by: Breana Martinez MD    Answers submitted by the patient for this visit:  Patient Health Questionnaire (Submitted on 1/8/2025)  If you checked off any problems, how difficult have these problems made it for you to do your work, take care of things at home, or get along with other people?: Somewhat difficult  PHQ9 TOTAL SCORE: 12    "

## 2025-01-14 LAB
BKR LAB AP GYN ADEQUACY: NORMAL
BKR LAB AP GYN INTERPRETATION: NORMAL
BKR LAB AP HPV REFLEX: NORMAL
BKR LAB AP LMP: NORMAL
BKR LAB AP PREVIOUS ABNORMAL: NORMAL
PATH REPORT.COMMENTS IMP SPEC: NORMAL
PATH REPORT.COMMENTS IMP SPEC: NORMAL
PATH REPORT.RELEVANT HX SPEC: NORMAL

## 2025-02-04 ENCOUNTER — VIRTUAL VISIT (OUTPATIENT)
Dept: FAMILY MEDICINE | Facility: CLINIC | Age: 25
End: 2025-02-04
Payer: COMMERCIAL

## 2025-02-04 DIAGNOSIS — F32.1 CURRENT MODERATE EPISODE OF MAJOR DEPRESSIVE DISORDER, UNSPECIFIED WHETHER RECURRENT (H): Primary | ICD-10-CM

## 2025-02-04 DIAGNOSIS — F40.10 SOCIAL ANXIETY DISORDER: ICD-10-CM

## 2025-02-04 PROCEDURE — 98005 SYNCH AUDIO-VIDEO EST LOW 20: CPT | Performed by: FAMILY MEDICINE

## 2025-02-04 NOTE — PROGRESS NOTES
Nadia is a 24 year old who is being evaluated via a billable video visit.    How would you like to obtain your AVS? MyChart  If the video visit is dropped, the invitation should be resent by: Text to cell phone: [phone]  Will anyone else be joining your video visit? No      Assessment & Plan     Current moderate episode of major depressive disorder, unspecified whether recurrent (H)  Social anxiety disorder  Recently opted to wean off venlafaxine over the past few months; now off completely for the past 1.5 weeks (due to new headaches after 6-7yr of this SNRI on board). Some irritability right now that she is noticing (?discontinuation effect vs baseline). I've encouraged her to track (#1-10) on a calendar any anxiety/irritability/depression symptoms and we can review in 2-3 months to see if there's a cyclic trend (as right now she is in her PMS week prior to her period which could be increasing irritability).      21 minutes spent on the date of the encounter doing chart review, patient visit and documentation.    Follow up 2 months     The longitudinal plan of care for the diagnosis(es)/condition(s) as documented were addressed during thisvisit. Due to the added complexity in care, I will continue to support Nadia in the subsequent management and with ongoing continuity of care.        Subjective   Nadia is a 24 year old, presenting for the following health issues:  Follow Up    HPI     Here for 1 month follow up her depression/anxiety after weaning fully off the venlafaxine. Venlafaxine helped but was getting headaches after 6-7 years on it (in setting of new CBD use and likely metabolised differently)   She did find a new THC dispensary that felt more safe and also didn't feel any concern after using that; using a few times a week for sleep/relaxation    She was able to take her last dose about 1.5 week ago.   She did get some vertigo/dizziness  during the transition off but has been feeling good this past  week.    She is noticing increased irritability since coming off the medication.   Easily upset by things.   She isn't sure if that's her baseline (now being off meds for 1.5 wks) or if it's a discontinuation effect    She states she is due to have a period in about 1 week    She does still do the weekly counseling- she is working on more resiliency with big emotions and leaning into the discomfort                       Objective           Vitals:  No vitals were obtained today due to virtual visit.    Physical Exam   GENERAL: alert and no distress  EYES: Eyes grossly normal to inspection.  No discharge or erythema, or obvious scleral/conjunctival abnormalities.  RESP: No audible wheeze, cough, or visible cyanosis.    SKIN: Visible skin clear. No significant rash, abnormal pigmentation or lesions.  NEURO: Cranial nerves grossly intact.  Mentation and speech appropriate for age.  PSYCH: Appropriate affect, tone, and pace of words          Video-Visit Details    Type of service:  Video Visit   Originating Location (pt. Location): Home    Distant Location (provider location):  On-site  Platform used for Video Visit: Yu  Signed Electronically by: Breana Martinez MD

## 2025-02-04 NOTE — PATIENT INSTRUCTIONS
"HIGHLY SENSITIVE PERSONALITY TRAIT     Do the words \"spirited\", \"highly sensitive\", \"deeply feeling\" \"persistent\" or \"strong willed\" or \"perceptive\" describe your special child?  Are you raising an Marie? (Vs a dandelion?). With the right skill sets we can all be expert gardeners!  HSC- highly sensitive child  HSP- highly sensitive person (HSCs all grown up)    Here are a few resources that might help with the mindset and meltdowns to move forward!    Raising Your Spirited Child - book by Jana Jo Jeana    \"Parent your Highly Sensitive Child Like a Ninja\"- podcast with Itzel Stuart - all the episodes  Online \"bootcamp\" for parents of HSCs in the meltdown cycle  https://www.[email or web address]/  -->Click \"community\" at the top for her free podcast and FB groups  Itzel is an experienced mental health therapist & Registered Play Therapist-Supervisor (RPT-S); HSCs are her life work!    \"Good Inside\" by Dr. García- book and Podcast- episode 22 \"Deeply Feeling Kids Need a Different Approach\"  Online workshops about Deeply Feeling Kids by Dr. Raquel Jean    \"Raising Good Humans\" podcast with Dr. Caitlyn Marshall, episode 65 \"Parenting the Highly Sensitive Child\"    \"TILT Parenting\" podcast with Dr. Latoya Marks, episode 214 \"How to Support and Parent a Highly Sensitive Child\"    Beyond Behaviors by Dr. Kimberli Payan (child psychologist)    The Explosive Child by Dr. Antoni Mejias      [place]mond Summaries from Dr. Christy Patel's Grand Rounds   \"The HSC- an overview for Pediatric Professionals\"  Youtube video link: https://www.youtube.com/watch?v=qqz11VTsHHv  Presented on May 3, 2023    What HSCs Need:    Validation- Enter into their world  \"You really want to keep watching that show... you love watching it; that makes sense\".    \"It's hard to stop playing with that toy\". \"You can do hard things\"  \"You are wondering if that's safe\".   Our belief that they are great and will succeed!   Mindful Parents- pay " "attention to their triggers before meltdowns occur- HANGRY?   Feelings games! Emotions are normal and Ok to have!  Connection before correction.  Praise good behaviors OFTEN.   Empowerment- help them understand their trait, help them advocate for themselves  Quiet time- finding activities that calm their bodies (fidgets/sensory sand, journaling, art; it may even be high sensation seeking exercise)  Education without shame- practicing through Role Play      What HCS Don't Need:    Punishment, especially corporal  (increases shame response)  Forcing or coercion  To \"toughen up\" or hide their feelings  To feel like you don't like them  To think they are broken or \"Just looking for attention\"  Coddling or permissive parenting  Protection from things that upset them    What their Parents Need:    Empathy  Education for skill gaps  Empowerment    You got this!! We can do this together :)        "

## 2025-03-02 ENCOUNTER — OFFICE VISIT (OUTPATIENT)
Dept: URGENT CARE | Facility: URGENT CARE | Age: 25
End: 2025-03-02
Payer: COMMERCIAL

## 2025-03-02 ENCOUNTER — NURSE TRIAGE (OUTPATIENT)
Dept: NURSING | Facility: CLINIC | Age: 25
End: 2025-03-02

## 2025-03-02 ENCOUNTER — E-VISIT (OUTPATIENT)
Dept: FAMILY MEDICINE | Facility: CLINIC | Age: 25
End: 2025-03-02
Payer: COMMERCIAL

## 2025-03-02 VITALS
WEIGHT: 135 LBS | OXYGEN SATURATION: 98 % | HEART RATE: 65 BPM | BODY MASS INDEX: 24.84 KG/M2 | DIASTOLIC BLOOD PRESSURE: 75 MMHG | RESPIRATION RATE: 12 BRPM | HEIGHT: 62 IN | SYSTOLIC BLOOD PRESSURE: 120 MMHG | TEMPERATURE: 98.7 F

## 2025-03-02 DIAGNOSIS — R09.81 NASAL CONGESTION: Primary | ICD-10-CM

## 2025-03-02 PROCEDURE — 3074F SYST BP LT 130 MM HG: CPT | Performed by: FAMILY MEDICINE

## 2025-03-02 PROCEDURE — 99213 OFFICE O/P EST LOW 20 MIN: CPT | Performed by: FAMILY MEDICINE

## 2025-03-02 PROCEDURE — 99207 PR NON-BILLABLE SERV PER CHARTING: CPT | Performed by: FAMILY MEDICINE

## 2025-03-02 PROCEDURE — 3078F DIAST BP <80 MM HG: CPT | Performed by: FAMILY MEDICINE

## 2025-03-02 RX ORDER — FLUTICASONE PROPIONATE 50 MCG
1 SPRAY, SUSPENSION (ML) NASAL DAILY
Qty: 18.2 ML | Refills: 0 | Status: SHIPPED | OUTPATIENT
Start: 2025-03-02

## 2025-03-02 NOTE — PROGRESS NOTES
"  Assessment & Plan     Nasal congestion  Differentials discussed in detail including allergic rhinitis.  Flonase prescribed and recommended well hydration, warm fluids, steam inhalation, humidifier use and and over-the-counter antihistamine.  Suggested to follow-up with PCP in 5 to 7 days or earlier if needed.  Patient understood and in agreement with above plan.  All questions answered.  - fluticasone (FLONASE) 50 MCG/ACT nasal spray; Spray 1 spray into both nostrils daily.        Reji Zuniga is a 24 year old, presenting for the following health issues:  Urgent Care    HPI      Concern -   Onset: 2 weeks   Description: nasal congestion and sneezing   Intensity: moderate  Progression of Symptoms:  same  Accompanying Signs & Symptoms: no fever, chills, sore throat, cough, sob or other relevant systemic symptoms   Previous history of similar problem:   Therapies tried and outcome: Equate and saline spray      Review of Systems  Constitutional, HEENT, cardiovascular, pulmonary, gi and gu systems are negative, except as otherwise noted.      Objective    /75   Pulse 65   Temp 98.7  F (37.1  C) (Temporal)   Resp 12   Ht 1.575 m (5' 2\")   Wt 61.2 kg (135 lb)   LMP 12/09/2024 (Approximate)   SpO2 98%   BMI 24.69 kg/m    Body mass index is 24.69 kg/m .  Physical Exam   GENERAL: alert and no distress  EYES: Eyes grossly normal to inspection, PERRL and conjunctivae and sclerae normal  HENT: normal cephalic/atraumatic, ear canals and TM's normal, nasal mucosa edematous , oropharynx clear, oral mucous membranes moist, and sinuses: not tender  NECK: no adenopathy, no asymmetry, masses, or scars  RESP: lungs clear to auscultation - no rales, rhonchi or wheezes  CNS: Grossly intact  MS: no gross musculoskeletal defects noted, no edema          Signed Electronically by: Brayan Ramos MD    "

## 2025-03-02 NOTE — TELEPHONE ENCOUNTER
Nurse Triage SBAR    Situation: Nasal congestion     Background: Patient calling. For the last 2 weeks she has been having a stuffy nose.     Assessment: She has been taking decongestants. Feeling lightheaded. She has been having to breath through her mouth for the last few weeks. On and off headaches - 4/10. Denied feeling feverish - unable to take her temp. Some sinus pain - 4/10. Her eye lids are puffy.     Protocol Recommended Disposition: See Physician within 4 hours (Or PCP Triage)    Recommendation: According to the protocol, Patient should be seen within 4 hours (Or PCP Triage). Advised Patient that the patient needs to be seen within 4 hours (Or PCP Triage). Care advice given. Patient verbalizes understanding and agrees with plan of care. Reviewed concerning symptoms and when to call back.     Amita Benitez RN Nursing Advisor 3/2/2025 11:45 AM     Reason for Disposition   [1] Redness or swelling on the cheek, forehead or around the eye AND [2] no fever    Additional Information   Negative: SEVERE difficulty breathing (e.g., struggling for each breath, speaks in single words)   Negative: Sounds like a life-threatening emergency to the triager   Negative: [1] Sinus infection AND [2] taking an antibiotic AND [3] symptoms continue   Negative: [1] Difficulty breathing AND [2] not from stuffy nose (e.g., not relieved by cleaning out the nose)   Negative: [1] SEVERE headache AND [2] fever   Negative: [1] Redness or swelling on the cheek, forehead or around the eye AND [2] fever   Negative: Fever > 104 F (40 C)   Negative: Patient sounds very sick or weak to the triager   Negative: [1] SEVERE pain AND [2] not improved 2 hours after pain medicine    Protocols used: Sinus Pain or Congestion-A-

## 2025-03-05 ENCOUNTER — PATIENT OUTREACH (OUTPATIENT)
Dept: CARE COORDINATION | Facility: CLINIC | Age: 25
End: 2025-03-05
Payer: COMMERCIAL

## 2025-03-23 ENCOUNTER — OFFICE VISIT (OUTPATIENT)
Dept: URGENT CARE | Facility: URGENT CARE | Age: 25
End: 2025-03-23
Payer: COMMERCIAL

## 2025-03-23 VITALS
HEIGHT: 63 IN | DIASTOLIC BLOOD PRESSURE: 77 MMHG | BODY MASS INDEX: 24.8 KG/M2 | OXYGEN SATURATION: 100 % | RESPIRATION RATE: 16 BRPM | HEART RATE: 79 BPM | TEMPERATURE: 98.6 F | SYSTOLIC BLOOD PRESSURE: 124 MMHG | WEIGHT: 140 LBS

## 2025-03-23 DIAGNOSIS — T78.3XXA ANGIOEDEMA, INITIAL ENCOUNTER: ICD-10-CM

## 2025-03-23 DIAGNOSIS — T78.40XA ALLERGIC REACTION, INITIAL ENCOUNTER: Primary | ICD-10-CM

## 2025-03-23 PROCEDURE — 3074F SYST BP LT 130 MM HG: CPT | Performed by: FAMILY MEDICINE

## 2025-03-23 PROCEDURE — 99214 OFFICE O/P EST MOD 30 MIN: CPT | Performed by: FAMILY MEDICINE

## 2025-03-23 PROCEDURE — 96372 THER/PROPH/DIAG INJ SC/IM: CPT | Performed by: FAMILY MEDICINE

## 2025-03-23 PROCEDURE — 3078F DIAST BP <80 MM HG: CPT | Performed by: FAMILY MEDICINE

## 2025-03-23 RX ORDER — EPINEPHRINE 0.3 MG/.3ML
0.3 INJECTION SUBCUTANEOUS ONCE
Status: COMPLETED | OUTPATIENT
Start: 2025-03-23 | End: 2025-03-23

## 2025-03-23 RX ORDER — FAMOTIDINE 40 MG/1
40 TABLET, FILM COATED ORAL DAILY
Qty: 7 TABLET | Refills: 0 | Status: SHIPPED | OUTPATIENT
Start: 2025-03-23

## 2025-03-23 RX ORDER — EPINEPHRINE 0.3 MG/.3ML
0.3 INJECTION SUBCUTANEOUS PRN
Qty: 2 EACH | Refills: 0 | Status: SHIPPED | OUTPATIENT
Start: 2025-03-23

## 2025-03-23 RX ORDER — CETIRIZINE HYDROCHLORIDE 10 MG/1
10 TABLET ORAL DAILY
Qty: 7 TABLET | Refills: 0 | Status: SHIPPED | OUTPATIENT
Start: 2025-03-23

## 2025-03-23 RX ADMIN — EPINEPHRINE 0.3 MG: 0.3 INJECTION SUBCUTANEOUS at 10:22

## 2025-03-23 ASSESSMENT — ENCOUNTER SYMPTOMS
SHORTNESS OF BREATH: 1
PALPITATIONS: 0
FEVER: 0
TROUBLE SWALLOWING: 1

## 2025-03-23 NOTE — PROGRESS NOTES
Assessment & Plan     Allergic reaction, initial encounter  Patient has acute presentation consistent with angioedema after taking Imitrex.  Known history of shellfish allergy.  With the observed macroglossia and difficulty swallowing, dyspnea, I gave the patient 0.3 mg of epinephrine, rechecked 15 minutes, patient reports excellent improvement and complete resolution of symptoms.  Unsure if symptoms were caused from Imitrex which she has had before, notably patient did have some Ramen, she does have a history of shellfish allergies.  I informed patient that she should be observed in the ER setting for the next 4 hours to make sure she does not have a rebound allergic reaction which can be fatal.  They voiced understanding of this and would like to be discharged home, I did review proper epinephrine use, if she is having worsening symptoms, give epinephrine again and call 911.  Otherwise, recommend patient take Zyrtec and Pepcid for the next week.  Follow-up with her primary care provider for ongoing management discussion of changing migraine medications.    I called patient 4 hours after the visit: ~ 8pm to check in on the patient; no answer x 2.     - EPINEPHrine (ANY BX GENERIC EQUIV) injection 0.3 mg  - cetirizine (ZYRTEC) 10 MG tablet  Dispense: 7 tablet; Refill: 0  - famotidine (PEPCID) 40 MG tablet  Dispense: 7 tablet; Refill: 0  - EPINEPHrine (ANY BX GENERIC EQUIV) 0.3 MG/0.3ML injection 2-pack  Dispense: 2 each; Refill: 0    Angioedema, initial encounter  Please see above for management.  - EPINEPHrine (ANY BX GENERIC EQUIV) injection 0.3 mg  - cetirizine (ZYRTEC) 10 MG tablet  Dispense: 7 tablet; Refill: 0  - famotidine (PEPCID) 40 MG tablet  Dispense: 7 tablet; Refill: 0  - EPINEPHrine (ANY BX GENERIC EQUIV) 0.3 MG/0.3ML injection 2-pack  Dispense: 2 each; Refill: 0          Return in about 1 day (around 3/24/2025) for If symptoms do not improve or gets worse..    Miguelito Daniel MD  [facility]  "[facility] [place]    Reji Zuniga is a 24 year old female who presents to clinic today for the following health issues:  Chief Complaint   Patient presents with    Breathing Problem     Patient states is having SOB, troubling swallowing, and tinging in both are, states had taken Imitrex @1/2 -1 hour ago       HPI    Patient 24-year-old female who presents urgent care with shortness of breath, trouble swallowing, enlargement and tingling.  States this occurred half hour after taking Imitrex.  She has a history of shellfish allergy which causes tongue and lip swelling.    No fever, cough, chest pain, racing heartbeat.      Review of Systems   Constitutional:  Negative for fever.   HENT:  Positive for trouble swallowing.    Respiratory:  Positive for shortness of breath.    Cardiovascular:  Negative for chest pain and palpitations.           Objective    /77 (BP Location: Right arm, Patient Position: Sitting, Cuff Size: Adult Regular)   Pulse 79   Temp 98.6  F (37  C) (Temporal)   Resp 16   Ht 1.588 m (5' 2.5\")   Wt 63.5 kg (140 lb)   SpO2 100%   BMI 25.20 kg/m    Physical Exam  Vitals reviewed.   Constitutional:       Appearance: She is not ill-appearing.   HENT:      Head:      Comments: Vocal hoarseness     Mouth/Throat:      Comments: Tongue enlargement, unable to see the posterior oropharynx.    No lip enlargement.  Cardiovascular:      Rate and Rhythm: Normal rate and regular rhythm.   Pulmonary:      Breath sounds: Normal breath sounds.   Neurological:      Mental Status: She is alert.                  "

## 2025-07-31 DIAGNOSIS — Z30.41 ENCOUNTER FOR SURVEILLANCE OF CONTRACEPTIVE PILLS: ICD-10-CM

## 2025-07-31 RX ORDER — NORGESTIMATE AND ETHINYL ESTRADIOL 0.25-0.035
1 KIT ORAL DAILY
Qty: 84 TABLET | Refills: 1 | Status: SHIPPED | OUTPATIENT
Start: 2025-07-31